# Patient Record
Sex: FEMALE | Race: WHITE | Employment: UNEMPLOYED | URBAN - NONMETROPOLITAN AREA
[De-identification: names, ages, dates, MRNs, and addresses within clinical notes are randomized per-mention and may not be internally consistent; named-entity substitution may affect disease eponyms.]

---

## 2017-04-17 ENCOUNTER — HOSPITAL ENCOUNTER (OUTPATIENT)
Dept: OTHER | Age: 42
Discharge: HOME | End: 2017-04-17
Attending: NURSE PRACTITIONER | Admitting: NURSE PRACTITIONER

## 2017-04-17 ENCOUNTER — OFFICE VISIT (OUTPATIENT)
Dept: OTHER | Age: 42
End: 2017-04-17

## 2017-04-17 VITALS
WEIGHT: 229 LBS | SYSTOLIC BLOOD PRESSURE: 118 MMHG | OXYGEN SATURATION: 99 % | HEART RATE: 100 BPM | DIASTOLIC BLOOD PRESSURE: 78 MMHG

## 2017-04-17 DIAGNOSIS — Z00.00 ENCOUNTER FOR PREVENTIVE HEALTH EXAMINATION: Primary | ICD-10-CM

## 2017-04-17 DIAGNOSIS — N92.0 MENORRHAGIA WITH REGULAR CYCLE: ICD-10-CM

## 2017-04-17 PROCEDURE — 99205 OFFICE O/P NEW HI 60 MIN: CPT | Performed by: NURSE PRACTITIONER

## 2017-04-17 ASSESSMENT — ENCOUNTER SYMPTOMS
EYES NEGATIVE: 1
RESPIRATORY NEGATIVE: 1
GASTROINTESTINAL NEGATIVE: 1
ALLERGIC/IMMUNOLOGIC NEGATIVE: 1

## 2017-04-24 ENCOUNTER — TELEPHONE (OUTPATIENT)
Dept: OTHER | Age: 42
End: 2017-04-24

## 2017-08-01 ENCOUNTER — HOSPITAL ENCOUNTER (OUTPATIENT)
Dept: OTHER | Age: 42
Discharge: HOME | End: 2017-08-01
Attending: NURSE PRACTITIONER | Admitting: NURSE PRACTITIONER

## 2017-08-01 ENCOUNTER — OFFICE VISIT (OUTPATIENT)
Dept: OTHER | Age: 42
End: 2017-08-01

## 2017-08-01 VITALS
OXYGEN SATURATION: 99 % | SYSTOLIC BLOOD PRESSURE: 126 MMHG | WEIGHT: 242 LBS | HEART RATE: 67 BPM | DIASTOLIC BLOOD PRESSURE: 74 MMHG

## 2017-08-01 DIAGNOSIS — Z13.0 SCREENING, ANEMIA, DEFICIENCY, IRON: ICD-10-CM

## 2017-08-01 DIAGNOSIS — Z00.00 HEALTH CARE MAINTENANCE: ICD-10-CM

## 2017-08-01 DIAGNOSIS — F33.2 SEVERE EPISODE OF RECURRENT MAJOR DEPRESSIVE DISORDER, WITHOUT PSYCHOTIC FEATURES (HCC): ICD-10-CM

## 2017-08-01 DIAGNOSIS — Z13.29 SCREENING FOR THYROID DISORDER: ICD-10-CM

## 2017-08-01 DIAGNOSIS — F41.9 ANXIETY: ICD-10-CM

## 2017-08-01 DIAGNOSIS — R45.86 MOOD SWINGS: Primary | ICD-10-CM

## 2017-08-01 PROCEDURE — 96160 PT-FOCUSED HLTH RISK ASSMT: CPT | Performed by: NURSE PRACTITIONER

## 2017-08-01 PROCEDURE — 99214 OFFICE O/P EST MOD 30 MIN: CPT | Performed by: NURSE PRACTITIONER

## 2017-08-01 RX ORDER — MEDROXYPROGESTERONE ACETATE 10 MG/1
TABLET ORAL
COMMUNITY
Start: 2017-07-27 | End: 2018-04-19

## 2017-08-01 ASSESSMENT — ENCOUNTER SYMPTOMS
RESPIRATORY NEGATIVE: 1
ALLERGIC/IMMUNOLOGIC NEGATIVE: 1
WHEEZING: 0
VOMITING: 0
SORE THROAT: 0
NAUSEA: 1
DIARRHEA: 0
BACK PAIN: 1
SHORTNESS OF BREATH: 0
COUGH: 0
PHOTOPHOBIA: 1

## 2017-08-01 ASSESSMENT — PATIENT HEALTH QUESTIONNAIRE - PHQ9
5. POOR APPETITE OR OVEREATING: 3
4. FEELING TIRED OR HAVING LITTLE ENERGY: 3
3. TROUBLE FALLING OR STAYING ASLEEP: 3
7. TROUBLE CONCENTRATING ON THINGS, SUCH AS READING THE NEWSPAPER OR WATCHING TELEVISION: 1
SUM OF ALL RESPONSES TO PHQ QUESTIONS 1-9: 18
10. IF YOU CHECKED OFF ANY PROBLEMS, HOW DIFFICULT HAVE THESE PROBLEMS MADE IT FOR YOU TO DO YOUR WORK, TAKE CARE OF THINGS AT HOME, OR GET ALONG WITH OTHER PEOPLE: 2
8. MOVING OR SPEAKING SO SLOWLY THAT OTHER PEOPLE COULD HAVE NOTICED. OR THE OPPOSITE, BEING SO FIGETY OR RESTLESS THAT YOU HAVE BEEN MOVING AROUND A LOT MORE THAN USUAL: 0
1. LITTLE INTEREST OR PLEASURE IN DOING THINGS: 3
SUM OF ALL RESPONSES TO PHQ9 QUESTIONS 1 & 2: 4
2. FEELING DOWN, DEPRESSED OR HOPELESS: 1
9. THOUGHTS THAT YOU WOULD BE BETTER OFF DEAD, OR OF HURTING YOURSELF: 1
6. FEELING BAD ABOUT YOURSELF - OR THAT YOU ARE A FAILURE OR HAVE LET YOURSELF OR YOUR FAMILY DOWN: 3

## 2017-08-01 ASSESSMENT — ANXIETY QUESTIONNAIRES
6. BECOMING EASILY ANNOYED OR IRRITABLE: 3-NEARLY EVERY DAY
5. BEING SO RESTLESS THAT IT IS HARD TO SIT STILL: 3-NEARLY EVERY DAY
1. FEELING NERVOUS, ANXIOUS, OR ON EDGE: 1-SEVERAL DAYS
GAD7 TOTAL SCORE: 14
7. FEELING AFRAID AS IF SOMETHING AWFUL MIGHT HAPPEN: 0-NOT AT ALL SURE
2. NOT BEING ABLE TO STOP OR CONTROL WORRYING: 3-NEARLY EVERY DAY
4. TROUBLE RELAXING: 1-SEVERAL DAYS
3. WORRYING TOO MUCH ABOUT DIFFERENT THINGS: 3-NEARLY EVERY DAY

## 2017-08-07 ENCOUNTER — OFFICE VISIT (OUTPATIENT)
Dept: OTHER | Age: 42
End: 2017-08-07

## 2017-08-07 ENCOUNTER — HOSPITAL ENCOUNTER (OUTPATIENT)
Dept: OTHER | Age: 42
Discharge: HOME | End: 2017-08-07
Attending: NURSE PRACTITIONER | Admitting: NURSE PRACTITIONER

## 2017-08-07 VITALS
SYSTOLIC BLOOD PRESSURE: 119 MMHG | OXYGEN SATURATION: 100 % | HEART RATE: 60 BPM | DIASTOLIC BLOOD PRESSURE: 65 MMHG | WEIGHT: 223 LBS

## 2017-08-07 DIAGNOSIS — Z11.4 SCREENING FOR HIV WITHOUT PRESENCE OF RISK FACTORS: ICD-10-CM

## 2017-08-07 DIAGNOSIS — Z13.220 SCREENING CHOLESTEROL LEVEL: ICD-10-CM

## 2017-08-07 DIAGNOSIS — F33.2 SEVERE EPISODE OF RECURRENT MAJOR DEPRESSIVE DISORDER, WITHOUT PSYCHOTIC FEATURES (HCC): Primary | ICD-10-CM

## 2017-08-07 PROCEDURE — 99212 OFFICE O/P EST SF 10 MIN: CPT | Performed by: NURSE PRACTITIONER

## 2017-08-07 ASSESSMENT — ENCOUNTER SYMPTOMS
ALLERGIC/IMMUNOLOGIC NEGATIVE: 1
RESPIRATORY NEGATIVE: 1
GASTROINTESTINAL NEGATIVE: 1
EYES NEGATIVE: 1

## 2017-08-14 ENCOUNTER — TELEPHONE (OUTPATIENT)
Dept: OTHER | Age: 42
End: 2017-08-14

## 2017-08-25 ENCOUNTER — HOSPITAL ENCOUNTER (OUTPATIENT)
Dept: OTHER | Age: 42
Discharge: HOME | End: 2017-08-25
Attending: NURSE PRACTITIONER | Admitting: NURSE PRACTITIONER

## 2017-08-25 ENCOUNTER — OFFICE VISIT (OUTPATIENT)
Dept: OTHER | Age: 42
End: 2017-08-25

## 2017-08-25 VITALS
OXYGEN SATURATION: 99 % | HEART RATE: 74 BPM | SYSTOLIC BLOOD PRESSURE: 128 MMHG | WEIGHT: 220 LBS | DIASTOLIC BLOOD PRESSURE: 86 MMHG

## 2017-08-25 DIAGNOSIS — F33.2 SEVERE EPISODE OF RECURRENT MAJOR DEPRESSIVE DISORDER, WITHOUT PSYCHOTIC FEATURES (HCC): ICD-10-CM

## 2017-08-25 DIAGNOSIS — R45.86 MOOD SWINGS: Primary | ICD-10-CM

## 2017-08-25 DIAGNOSIS — E78.5 DYSLIPIDEMIA: ICD-10-CM

## 2017-08-25 PROCEDURE — 99214 OFFICE O/P EST MOD 30 MIN: CPT | Performed by: NURSE PRACTITIONER

## 2017-08-25 RX ORDER — LAMOTRIGINE 25 MG/1
TABLET ORAL
Qty: 42 TABLET | Refills: 0 | Status: SHIPPED | OUTPATIENT
Start: 2017-08-25 | End: 2018-01-30 | Stop reason: SDUPTHER

## 2017-08-25 RX ORDER — ATORVASTATIN CALCIUM 40 MG/1
40 TABLET, FILM COATED ORAL DAILY
Qty: 30 TABLET | Refills: 0 | Status: SHIPPED | OUTPATIENT
Start: 2017-08-25 | End: 2018-04-19 | Stop reason: SDUPTHER

## 2017-08-25 ASSESSMENT — ENCOUNTER SYMPTOMS
SHORTNESS OF BREATH: 0
RESPIRATORY NEGATIVE: 1
NAUSEA: 0
COUGH: 0
VOMITING: 0
WHEEZING: 0
DIARRHEA: 0
GASTROINTESTINAL NEGATIVE: 1

## 2018-01-30 ENCOUNTER — OFFICE VISIT (OUTPATIENT)
Dept: INTERNAL MEDICINE CLINIC | Age: 43
End: 2018-01-30

## 2018-01-30 VITALS
HEIGHT: 67 IN | DIASTOLIC BLOOD PRESSURE: 78 MMHG | SYSTOLIC BLOOD PRESSURE: 120 MMHG | BODY MASS INDEX: 35 KG/M2 | OXYGEN SATURATION: 98 % | HEART RATE: 61 BPM | WEIGHT: 223 LBS | RESPIRATION RATE: 20 BRPM

## 2018-01-30 DIAGNOSIS — G56.02 CARPAL TUNNEL SYNDROME OF LEFT WRIST: Primary | ICD-10-CM

## 2018-01-30 DIAGNOSIS — R45.86 MOOD SWINGS: ICD-10-CM

## 2018-01-30 PROCEDURE — G8484 FLU IMMUNIZE NO ADMIN: HCPCS | Performed by: NURSE PRACTITIONER

## 2018-01-30 PROCEDURE — 1036F TOBACCO NON-USER: CPT | Performed by: NURSE PRACTITIONER

## 2018-01-30 PROCEDURE — 99212 OFFICE O/P EST SF 10 MIN: CPT | Performed by: NURSE PRACTITIONER

## 2018-01-30 PROCEDURE — G8427 DOCREV CUR MEDS BY ELIG CLIN: HCPCS | Performed by: NURSE PRACTITIONER

## 2018-01-30 PROCEDURE — G8417 CALC BMI ABV UP PARAM F/U: HCPCS | Performed by: NURSE PRACTITIONER

## 2018-01-30 RX ORDER — LAMOTRIGINE 25 MG/1
TABLET ORAL
Qty: 42 TABLET | Refills: 0 | Status: SHIPPED | OUTPATIENT
Start: 2018-01-30 | End: 2018-02-19 | Stop reason: SDUPTHER

## 2018-01-30 RX ORDER — GABAPENTIN 300 MG/1
300 CAPSULE ORAL 3 TIMES DAILY
Qty: 90 CAPSULE | Refills: 0 | Status: SHIPPED | OUTPATIENT
Start: 2018-01-30 | End: 2018-04-19 | Stop reason: SDUPTHER

## 2018-02-19 DIAGNOSIS — R45.86 MOOD SWINGS: ICD-10-CM

## 2018-02-19 RX ORDER — LAMOTRIGINE 25 MG/1
TABLET ORAL
Qty: 42 TABLET | Refills: 0 | Status: SHIPPED | OUTPATIENT
Start: 2018-02-19 | End: 2018-04-19 | Stop reason: DRUGHIGH

## 2018-04-19 ENCOUNTER — OFFICE VISIT (OUTPATIENT)
Dept: INTERNAL MEDICINE CLINIC | Age: 43
End: 2018-04-19

## 2018-04-19 VITALS
OXYGEN SATURATION: 95 % | BODY MASS INDEX: 35.4 KG/M2 | WEIGHT: 226 LBS | SYSTOLIC BLOOD PRESSURE: 104 MMHG | HEART RATE: 69 BPM | DIASTOLIC BLOOD PRESSURE: 70 MMHG

## 2018-04-19 DIAGNOSIS — R45.86 MOOD SWINGS: Primary | ICD-10-CM

## 2018-04-19 DIAGNOSIS — Z00.00 HEALTH CARE MAINTENANCE: ICD-10-CM

## 2018-04-19 DIAGNOSIS — E78.5 DYSLIPIDEMIA: ICD-10-CM

## 2018-04-19 DIAGNOSIS — G89.29 CHRONIC BILATERAL LOW BACK PAIN WITHOUT SCIATICA: ICD-10-CM

## 2018-04-19 DIAGNOSIS — F33.1 MODERATE EPISODE OF RECURRENT MAJOR DEPRESSIVE DISORDER (HCC): ICD-10-CM

## 2018-04-19 DIAGNOSIS — F33.2 SEVERE EPISODE OF RECURRENT MAJOR DEPRESSIVE DISORDER, WITHOUT PSYCHOTIC FEATURES (HCC): ICD-10-CM

## 2018-04-19 DIAGNOSIS — G56.02 CARPAL TUNNEL SYNDROME OF LEFT WRIST: ICD-10-CM

## 2018-04-19 DIAGNOSIS — M54.50 CHRONIC BILATERAL LOW BACK PAIN WITHOUT SCIATICA: ICD-10-CM

## 2018-04-19 PROCEDURE — G8427 DOCREV CUR MEDS BY ELIG CLIN: HCPCS | Performed by: NURSE PRACTITIONER

## 2018-04-19 PROCEDURE — 1036F TOBACCO NON-USER: CPT | Performed by: NURSE PRACTITIONER

## 2018-04-19 PROCEDURE — 99214 OFFICE O/P EST MOD 30 MIN: CPT | Performed by: NURSE PRACTITIONER

## 2018-04-19 PROCEDURE — G8417 CALC BMI ABV UP PARAM F/U: HCPCS | Performed by: NURSE PRACTITIONER

## 2018-04-19 RX ORDER — ATORVASTATIN CALCIUM 40 MG/1
40 TABLET, FILM COATED ORAL DAILY
Qty: 90 TABLET | Refills: 1 | Status: SHIPPED | OUTPATIENT
Start: 2018-04-19 | End: 2019-06-21 | Stop reason: ALTCHOICE

## 2018-04-19 RX ORDER — LAMOTRIGINE 25 MG/1
TABLET ORAL
Qty: 42 TABLET | Refills: 0 | Status: CANCELLED | OUTPATIENT
Start: 2018-04-19

## 2018-04-19 RX ORDER — LAMOTRIGINE 25 MG/1
50 TABLET ORAL DAILY
Qty: 180 TABLET | Refills: 1 | Status: SHIPPED | OUTPATIENT
Start: 2018-04-19 | End: 2019-06-21

## 2018-04-19 RX ORDER — GABAPENTIN 300 MG/1
300 CAPSULE ORAL 3 TIMES DAILY
Qty: 270 CAPSULE | Refills: 0 | Status: SHIPPED | OUTPATIENT
Start: 2018-04-19 | End: 2019-06-21 | Stop reason: ALTCHOICE

## 2018-04-21 ENCOUNTER — HOSPITAL ENCOUNTER (OUTPATIENT)
Dept: LAB | Age: 43
Discharge: HOME | End: 2018-04-21
Attending: NURSE PRACTITIONER | Admitting: NURSE PRACTITIONER

## 2018-04-21 LAB
ALBUMIN SERPL-MCNC: 3.7 GM/DL (ref 3.4–5)
ALP BLD-CCNC: 83 IU/L (ref 40–129)
ALT SERPL-CCNC: 15 U/L (ref 10–40)
ANION GAP SERPL CALCULATED.3IONS-SCNC: 8 MMOL/L (ref 4–16)
AST SERPL-CCNC: 22 IU/L (ref 15–37)
BASOPHILS ABSOLUTE: 0 K/CU MM
BASOPHILS RELATIVE PERCENT: 0.6 % (ref 0–1)
BILIRUB SERPL-MCNC: 0.5 MG/DL (ref 0–1)
BUN BLDV-MCNC: 14 MG/DL (ref 6–23)
CALCIUM SERPL-MCNC: 8.3 MG/DL (ref 8.3–10.6)
CHLORIDE BLD-SCNC: 104 MMOL/L (ref 99–110)
CHOLESTEROL, FASTING: 172 MG/DL
CO2: 28 MMOL/L (ref 21–32)
CREAT SERPL-MCNC: 1 MG/DL (ref 0.6–1.1)
DIFFERENTIAL TYPE: ABNORMAL
EOSINOPHILS ABSOLUTE: 0.4 K/CU MM
EOSINOPHILS RELATIVE PERCENT: 5.4 % (ref 0–3)
ESTIMATED AVERAGE GLUCOSE: 103 MG/DL
GFR AFRICAN AMERICAN: >60 ML/MIN/1.73M2
GFR NON-AFRICAN AMERICAN: >60 ML/MIN/1.73M2
GLUCOSE FASTING: 86 MG/DL (ref 70–99)
HBA1C MFR BLD: 5.2 % (ref 4.2–6.3)
HCT VFR BLD CALC: 37.3 % (ref 37–47)
HDLC SERPL-MCNC: 68 MG/DL
HEMOGLOBIN: 11.9 GM/DL (ref 12.5–16)
IMMATURE NEUTROPHIL %: 0.3 % (ref 0–0.43)
LDL CHOLESTEROL DIRECT: 113 MG/DL
LYMPHOCYTES ABSOLUTE: 1.3 K/CU MM
LYMPHOCYTES RELATIVE PERCENT: 18.7 % (ref 24–44)
MCH RBC QN AUTO: 27.4 PG (ref 27–31)
MCHC RBC AUTO-ENTMCNC: 31.9 % (ref 32–36)
MCV RBC AUTO: 85.7 FL (ref 78–100)
MONOCYTES ABSOLUTE: 0.5 K/CU MM
MONOCYTES RELATIVE PERCENT: 7.6 % (ref 0–4)
PDW BLD-RTO: 13.3 % (ref 11.7–14.9)
PLATELET # BLD: 247 K/CU MM (ref 140–440)
PMV BLD AUTO: 9.9 FL (ref 7.5–11.1)
POTASSIUM SERPL-SCNC: 4.2 MMOL/L (ref 3.5–5.1)
RBC # BLD: 4.35 M/CU MM (ref 4.2–5.4)
SEGMENTED NEUTROPHILS ABSOLUTE COUNT: 4.5 K/CU MM
SEGMENTED NEUTROPHILS RELATIVE PERCENT: 67.4 % (ref 36–66)
SODIUM BLD-SCNC: 140 MMOL/L (ref 135–145)
T4 FREE: 1.12 NG/DL (ref 0.9–1.8)
TOTAL IMMATURE NEUTOROPHIL: 0.02 K/CU MM
TOTAL PROTEIN: 7.4 GM/DL (ref 6.4–8.2)
TRIGLYCERIDE, FASTING: 49 MG/DL
TSH HIGH SENSITIVITY: 1.48 UIU/ML (ref 0.27–4.2)
WBC # BLD: 6.7 K/CU MM (ref 4–10.5)

## 2018-04-22 PROBLEM — M54.50 CHRONIC BILATERAL LOW BACK PAIN WITHOUT SCIATICA: Status: ACTIVE | Noted: 2018-04-22

## 2018-04-22 PROBLEM — F33.2 SEVERE EPISODE OF RECURRENT MAJOR DEPRESSIVE DISORDER, WITHOUT PSYCHOTIC FEATURES (HCC): Status: RESOLVED | Noted: 2017-08-01 | Resolved: 2018-04-22

## 2018-04-22 PROBLEM — E78.5 DYSLIPIDEMIA: Status: ACTIVE | Noted: 2018-04-22

## 2018-04-22 PROBLEM — G89.29 CHRONIC BILATERAL LOW BACK PAIN WITHOUT SCIATICA: Status: ACTIVE | Noted: 2018-04-22

## 2019-06-21 ENCOUNTER — OFFICE VISIT (OUTPATIENT)
Dept: INTERNAL MEDICINE CLINIC | Age: 44
End: 2019-06-21
Payer: COMMERCIAL

## 2019-06-21 VITALS
RESPIRATION RATE: 16 BRPM | OXYGEN SATURATION: 98 % | TEMPERATURE: 97.7 F | WEIGHT: 214 LBS | DIASTOLIC BLOOD PRESSURE: 66 MMHG | HEIGHT: 66 IN | HEART RATE: 64 BPM | BODY MASS INDEX: 34.39 KG/M2 | SYSTOLIC BLOOD PRESSURE: 126 MMHG

## 2019-06-21 DIAGNOSIS — M25.522 BILATERAL ELBOW JOINT PAIN: ICD-10-CM

## 2019-06-21 DIAGNOSIS — M25.521 BILATERAL ELBOW JOINT PAIN: ICD-10-CM

## 2019-06-21 DIAGNOSIS — F33.1 MODERATE EPISODE OF RECURRENT MAJOR DEPRESSIVE DISORDER (HCC): Primary | ICD-10-CM

## 2019-06-21 DIAGNOSIS — E78.5 DYSLIPIDEMIA: ICD-10-CM

## 2019-06-21 PROCEDURE — 99203 OFFICE O/P NEW LOW 30 MIN: CPT | Performed by: INTERNAL MEDICINE

## 2019-06-21 RX ORDER — ESCITALOPRAM OXALATE 10 MG/1
10 TABLET ORAL DAILY
Qty: 30 TABLET | Refills: 3 | Status: SHIPPED | OUTPATIENT
Start: 2019-06-21 | End: 2021-01-18

## 2019-06-21 NOTE — PROGRESS NOTES
Manan Hernández  Patient's  is 1975  Seen in office on 2019      SUBJECTIVE:  Siobhan pichardo 38 y. o.year old female presents today   Chief Complaint   Patient presents with   Rubi Maldonado Doctor     previous pt of Marlyn Galvan    Depression     loss of father and neice,\"cant take it anymoe\"    Pain     elbow bilateral, constant pain     Anxiety     happen at work     Patient is here to establish as a new patient. Complains of depression. It got worse after the loss of her father and niece. Patient feels down. No suicidal ideation. Complains of pain in both shoulders. Denies any injury. Patient has history of anxiety usually happens at work. Patient denies any chest pain. No shortness of breath. No cough or sputum production  No nausea, vomiting or diarrhea. Taking medications regularly. No side effects noted. Review of Systems  Review of system as mentioned in HPI  OBJECTIVE: /66   Pulse 64   Temp 97.7 °F (36.5 °C)   Resp 16   Ht 5' 5.5\" (1.664 m)   Wt 214 lb (97.1 kg)   SpO2 98%   BMI 35.07 kg/m²     Wt Readings from Last 3 Encounters:   19 214 lb (97.1 kg)   18 226 lb (102.5 kg)   18 223 lb (101.2 kg)      GENERAL: - Alert, oriented, pleasant, in no apparent distress. HEENT: - Conjunctiva pink, no scleral icterus. ENT clear. NECK: -Supple. No jugular venous distention noted. No masses felt,  CARDIOVASCULAR: - Normal S1 and S2    PULMONARY: - No respiratory distress. No wheezes or rales. ABDOMEN: - Soft and non-tender,no masses  ororganomegaly. EXTREMITIES: - No cyanosis, clubbing, or significant edema. SKIN: Skin is warm and dry. NEUROLOGICAL: - Cranial nerves II through XII are grossly intact. IMPRESSION:    Encounter Diagnoses   Name Primary?  Moderate episode of recurrent major depressive disorder (HCC) Yes    Dyslipidemia     Bilateral elbow joint pain        ASSESSMENT/PLAN:    1. Patient has history of depression and anxiety. Will start patient on Lexapro. 2.  Referred patient to psychology  Tarah Son  3. Patient has history of hyperlipidemia. We will recheck lipid profile  4. Patient has bilateral elbow joint pain. Will do work-up for arthritis  5. Side effects of medication discussed. If increased depression call or go to ER  6. Return to office in 1 month    Orders Placed This Encounter   Medications    escitalopram (LEXAPRO) 10 MG tablet     Sig: Take 1 tablet by mouth daily     Dispense:  30 tablet     Refill:  3         No problem-specific Assessment & Plan notes found for this encounter. Orders Placed This Encounter   Procedures    Comprehensive Metabolic Panel    Lipid, Fasting    CBC Auto Differential    STEPHY    Rheumatoid Factor    Uric Acid    Sedimentation Rate    TSH without Reflex    Ambulatory referral to Psychology     Orders Placed This Encounter   Medications    escitalopram (LEXAPRO) 10 MG tablet     Sig: Take 1 tablet by mouth daily     Dispense:  30 tablet     Refill:  3         Mediations reviewed with the patient. Continue current medications. Appropriate prescriptions are addressed. After visit summeryprovided. Follow up as directed sooner if needed. Questions answered and patient verbalizes understanding. Call for any problems, questions, or concerns. No Known Allergies  Current Outpatient Medications   Medication Sig Dispense Refill    gabapentin (NEURONTIN) 300 MG capsule Take 1 capsule by mouth 3 times daily for 90 days. . 270 capsule 0    atorvastatin (LIPITOR) 40 MG tablet Take 1 tablet by mouth daily 90 tablet 1     No current facility-administered medications for this visit.       Past Medical History:   Diagnosis Date    Acute renal failure ([facility] [address] 4/23/2015    Carpal tunnel syndrome of left wrist 1/30/2018    Chronic back pain     Depression 12/5/2015    Headache     rare migraine    Kidney stone     Obesity     Ovarian cyst      Past Surgical History:   Procedure Laterality Date    KIDNEY STONE SURGERY Left 2015    TUBAL LIGATION       Social History     Tobacco Use    Smoking status: Never Smoker    Smokeless tobacco: Never Used   Substance Use Topics    Alcohol use: No       LAB REVIEW:  CBC:   Lab Results   Component Value Date    WBC 6.7 04/21/2018    HGB 11.9 04/21/2018    HCT 37.3 04/21/2018     04/21/2018     Lipids:   Lab Results   Component Value Date    HDL 68 04/21/2018    LDLDIRECT 113 (H) 04/21/2018    TRIGLYCFAST 49 04/21/2018     Renal:   Lab Results   Component Value Date    BUN 14 04/21/2018    CREATININE 1.0 04/21/2018     04/21/2018    K 4.2 04/21/2018    ALT 15 04/21/2018    AST 22 04/21/2018     PT/INR: No results found for: INR  A1C:   Lab Results   Component Value Date    LABA1C 5.2 04/21/2018           Francisco Martell MD, 6/21/2019 , 10:18 AM

## 2019-07-02 LAB
ALBUMIN SERPL-MCNC: 3.9 G/DL
ALP BLD-CCNC: 85 U/L
ALT SERPL-CCNC: 14 U/L
ANA TITER: NORMAL
ANION GAP SERPL CALCULATED.3IONS-SCNC: NORMAL MMOL/L
AST SERPL-CCNC: 20 U/L
BASOPHILS ABSOLUTE: NORMAL /ΜL
BASOPHILS RELATIVE PERCENT: 0.5 %
BILIRUB SERPL-MCNC: 0.3 MG/DL (ref 0.1–1.4)
BUN BLDV-MCNC: 8 MG/DL
CALCIUM SERPL-MCNC: 9 MG/DL
CHLORIDE BLD-SCNC: 100 MMOL/L
CHOLESTEROL, FASTING: 167
CO2: 27 MMOL/L
CREAT SERPL-MCNC: 0.89 MG/DL
EOSINOPHILS ABSOLUTE: NORMAL /ΜL
EOSINOPHILS RELATIVE PERCENT: 2.1 %
GFR CALCULATED: 73
GLUCOSE BLD-MCNC: 92 MG/DL
HCT VFR BLD CALC: 39.4 % (ref 36–46)
HDLC SERPL-MCNC: 41 MG/DL (ref 35–70)
HEMOGLOBIN: 13.4 G/DL (ref 12–16)
LDL CHOLESTEROL CALCULATED: 107 MG/DL (ref 0–160)
LYMPHOCYTES ABSOLUTE: NORMAL /ΜL
LYMPHOCYTES RELATIVE PERCENT: 23.2 %
MCH RBC QN AUTO: 28.4 PG
MCHC RBC AUTO-ENTMCNC: 34.1 G/DL
MCV RBC AUTO: 83.3 FL
MONOCYTES ABSOLUTE: NORMAL /ΜL
MONOCYTES RELATIVE PERCENT: 7.1 %
NEUTROPHILS ABSOLUTE: NORMAL /ΜL
NEUTROPHILS RELATIVE PERCENT: 67.1 %
PDW BLD-RTO: 12.9 %
PLATELET # BLD: 340 K/ΜL
PMV BLD AUTO: NORMAL FL
POTASSIUM SERPL-SCNC: 3.8 MMOL/L
RBC # BLD: 4.73 10^6/ΜL
RHEUMATOID FACTOR: <10
SODIUM BLD-SCNC: 139 MMOL/L
TOTAL PROTEIN: 7.7
TRIGLYCERIDE, FASTING: 93
TSH SERPL DL<=0.05 MIU/L-ACNC: 0.99 UIU/ML
URIC ACID: 4.6
WBC # BLD: 6.4 10^3/ML

## 2019-07-12 ENCOUNTER — OFFICE VISIT (OUTPATIENT)
Dept: INTERNAL MEDICINE CLINIC | Age: 44
End: 2019-07-12
Payer: COMMERCIAL

## 2019-07-12 ENCOUNTER — HOSPITAL ENCOUNTER (OUTPATIENT)
Age: 44
Discharge: HOME | End: 2019-07-12
Payer: COMMERCIAL

## 2019-07-12 VITALS
BODY MASS INDEX: 34.09 KG/M2 | OXYGEN SATURATION: 99 % | DIASTOLIC BLOOD PRESSURE: 86 MMHG | WEIGHT: 208 LBS | SYSTOLIC BLOOD PRESSURE: 102 MMHG | HEART RATE: 79 BPM

## 2019-07-12 DIAGNOSIS — F33.1 MODERATE EPISODE OF RECURRENT MAJOR DEPRESSIVE DISORDER (HCC): ICD-10-CM

## 2019-07-12 DIAGNOSIS — A04.71 RECURRENT CLOSTRIDIUM DIFFICILE DIARRHEA: Primary | ICD-10-CM

## 2019-07-12 DIAGNOSIS — A04.71 RECURRENT CLOSTRIDIUM DIFFICILE DIARRHEA: ICD-10-CM

## 2019-07-12 PROCEDURE — 87324 CLOSTRIDIUM AG IA: CPT

## 2019-07-12 PROCEDURE — 99213 OFFICE O/P EST LOW 20 MIN: CPT | Performed by: INTERNAL MEDICINE

## 2019-07-12 RX ORDER — METRONIDAZOLE 500 MG/1
500 TABLET ORAL 3 TIMES DAILY
Qty: 30 TABLET | Refills: 0 | Status: SHIPPED | OUTPATIENT
Start: 2019-07-12 | End: 2019-07-22

## 2019-07-12 RX ORDER — ESCITALOPRAM OXALATE 10 MG/1
10 TABLET ORAL DAILY
Qty: 30 TABLET | Refills: 0 | Status: SHIPPED | OUTPATIENT
Start: 2019-07-12 | End: 2019-07-25 | Stop reason: SDUPTHER

## 2019-07-12 NOTE — PROGRESS NOTES
Alex Bang  Patient's  is 1975  Seen in office on 2019      SUBJECTIVE:  Hugo Groves kylee 38 y. o.year old female presents today   Chief Complaint   Patient presents with    Diarrhea     Patient states she was hospitalized for Cdiff in .  Depression     Patient states she is very depressed. She is tearful and states today is her fathers birthday whom is passed. Patient states she had developed diarrhea went to the hospital and she was positive for C. difficile. She was given vancomycin which was very expensive for her. However she took the medications and improved for few days and the symptoms recurred again. Patient denies any nausea or vomiting. She is having some abdominal cramps and diarrhea again. No melena or hematochezia. No fever or chills  She is feeling depressed today and that is her  father's birthday. She is taking medications. Taking medications regularly. No side effects noted. Review of Systems    OBJECTIVE: /86 (Site: Right Upper Arm, Position: Sitting, Cuff Size: Medium Adult)   Pulse 79   Wt 208 lb (94.3 kg)   SpO2 99%   BMI 34.09 kg/m²     Wt Readings from Last 3 Encounters:   19 208 lb (94.3 kg)   19 214 lb (97.1 kg)   18 226 lb (102.5 kg)      GENERAL: - Alert, oriented, pleasant, in no apparent distress. HEENT: - Conjunctiva pink, no scleral icterus. ENT clear. NECK: -Supple. No jugular venous distention noted. No masses felt,  CARDIOVASCULAR: - Normal S1 and S2    PULMONARY: - No respiratory distress. No wheezes or rales. ABDOMEN: - Soft and non-tender,no masses  ororganomegaly. EXTREMITIES: - No cyanosis, clubbing, or significant edema. SKIN: Skin is warm and dry. NEUROLOGICAL: - Cranial nerves II through XII are grossly intact. IMPRESSION:    Encounter Diagnoses   Name Primary?     Recurrent Clostridium difficile diarrhea Yes    Moderate episode of recurrent major depressive disorder ([facility] [address]  04/21/2018     Lipids:   Lab Results   Component Value Date    HDL 68 04/21/2018    LDLDIRECT 113 (H) 04/21/2018    TRIGLYCFAST 49 04/21/2018     Renal:   Lab Results   Component Value Date    BUN 14 04/21/2018    CREATININE 1.0 04/21/2018     04/21/2018    K 4.2 04/21/2018    ALT 15 04/21/2018    AST 22 04/21/2018     PT/INR: No results found for: INR  A1C:   Lab Results   Component Value Date    LABA1C 5.2 04/21/2018           Reinaldo Irving MD, 7/12/2019 , 9:26 AM

## 2019-07-13 LAB
REASON FOR REJECTION: NORMAL
REJECTED TEST: NORMAL

## 2019-07-25 ENCOUNTER — OFFICE VISIT (OUTPATIENT)
Dept: INTERNAL MEDICINE CLINIC | Age: 44
End: 2019-07-25
Payer: COMMERCIAL

## 2019-07-25 VITALS
RESPIRATION RATE: 16 BRPM | TEMPERATURE: 97.7 F | BODY MASS INDEX: 33.07 KG/M2 | DIASTOLIC BLOOD PRESSURE: 68 MMHG | WEIGHT: 201.8 LBS | HEART RATE: 64 BPM | SYSTOLIC BLOOD PRESSURE: 118 MMHG | OXYGEN SATURATION: 98 %

## 2019-07-25 DIAGNOSIS — M25.522 BILATERAL ELBOW JOINT PAIN: ICD-10-CM

## 2019-07-25 DIAGNOSIS — R10.31 RIGHT LOWER QUADRANT ABDOMINAL PAIN: ICD-10-CM

## 2019-07-25 DIAGNOSIS — F33.1 MODERATE EPISODE OF RECURRENT MAJOR DEPRESSIVE DISORDER (HCC): ICD-10-CM

## 2019-07-25 DIAGNOSIS — M25.521 BILATERAL ELBOW JOINT PAIN: ICD-10-CM

## 2019-07-25 DIAGNOSIS — Z12.31 VISIT FOR SCREENING MAMMOGRAM: Primary | ICD-10-CM

## 2019-07-25 LAB
BILIRUBIN, POC: ABNORMAL
BLOOD URINE, POC: ABNORMAL
CLARITY, POC: ABNORMAL
COLOR, POC: ABNORMAL
GLUCOSE URINE, POC: NEGATIVE
KETONES, POC: NEGATIVE
LEUKOCYTE EST, POC: NEGATIVE
NITRITE, POC: NEGATIVE
PH, POC: 5.5
PROTEIN, POC: ABNORMAL
SPECIFIC GRAVITY, POC: >=1.03
UROBILINOGEN, POC: 0.2

## 2019-07-25 PROCEDURE — 1036F TOBACCO NON-USER: CPT | Performed by: INTERNAL MEDICINE

## 2019-07-25 PROCEDURE — G8417 CALC BMI ABV UP PARAM F/U: HCPCS | Performed by: INTERNAL MEDICINE

## 2019-07-25 PROCEDURE — 81002 URINALYSIS NONAUTO W/O SCOPE: CPT | Performed by: INTERNAL MEDICINE

## 2019-07-25 PROCEDURE — 99213 OFFICE O/P EST LOW 20 MIN: CPT | Performed by: INTERNAL MEDICINE

## 2019-07-25 PROCEDURE — G8427 DOCREV CUR MEDS BY ELIG CLIN: HCPCS | Performed by: INTERNAL MEDICINE

## 2019-07-25 NOTE — PROGRESS NOTES
Leandro Shown  Patient's  is 1975  Seen in office on 2019      SUBJECTIVE:  Shauna Eagle kylee 38 y. o.year old female presents today   Chief Complaint   Patient presents with    Abdominal Pain     still    Diarrhea    Weight Loss    Other     just found out sister dx with breast cancer     Patient states she has nausea since yesterday. Pt had diarrhea and had c.diff and was treated. She had solid stools and c. Diff was not tested. She has no fever or chills. Took flagy and she finished medication  When she had C.diff infection she went to [facility]   CBC was normal.  BMP was normal.   Other stool culture is negative. CT abdomen : 3mm stone in left kidney . No hydronephrosis. 2.5 cm right ovarian cyst.    Taking medications regularly. No side effects noted. Review of Systems    OBJECTIVE: /68   Pulse 64   Temp 97.7 °F (36.5 °C) (Oral)   Resp 16   Wt 201 lb 12.8 oz (91.5 kg)   SpO2 98%   BMI 33.07 kg/m²     Wt Readings from Last 3 Encounters:   19 201 lb 12.8 oz (91.5 kg)   19 208 lb (94.3 kg)   19 214 lb (97.1 kg)      GENERAL: - Alert, oriented, pleasant, in no apparent distress. Return to office in 1 month  HEENT: - Conjunctiva pink, no scleral icterus. ENT clear. NECK: -Supple. No jugular venous distention noted. No masses felt,  CARDIOVASCULAR: - Normal S1 and S2    PULMONARY: - No respiratory distress. No wheezes or rales. ABDOMEN: - Soft and non-tender,no masses  ororganomegaly. EXTREMITIES: - No cyanosis, clubbing, or significant edema. SKIN: Skin is warm and dry. NEUROLOGICAL: - Cranial nerves II through XII are grossly intact. Patient had labs done on 2019 at [facility].  Comprehensive metabolic panel is normal  Cholesterol 167, , HDL 41 and triglycerides 93. CBC is normal  ESR was 44    IMPRESSION:    Encounter Diagnoses   Name Primary?     Visit for screening mammogram Yes    Moderate episode of recurrent major depressive disorder ([facility] [address]     Bilateral elbow joint pain     Right lower quadrant abdominal pain        ASSESSMENT/PLAN:    Abdominal pain off and on. : refer to GI  C.diff has seem to resolved. Recheck is stools are loose  Weight loss : pt states she is working hard and has lost weight. Depression : continue lexapro and see Dr Rashida Gibbons. RTO in 1 month        Mediations reviewed with the patient. Continue current medications. Appropriate prescriptions are addressed. After visit summeryprovided. Follow up as directed sooner if needed. Questions answered and patient verbalizes understanding. Call for any problems, questions, or concerns. No Known Allergies  Current Outpatient Medications   Medication Sig Dispense Refill    escitalopram (LEXAPRO) 10 MG tablet Take 1 tablet by mouth daily 30 tablet 3     No current facility-administered medications for this visit.       Past Medical History:   Diagnosis Date    Acute renal failure ([facility] [address] 4/23/2015    Carpal tunnel syndrome of left wrist 1/30/2018    Chronic back pain     Depression 12/5/2015    Headache     rare migraine    Kidney stone     Obesity     Ovarian cyst      Past Surgical History:   Procedure Laterality Date    KIDNEY STONE SURGERY Left 2015    TUBAL LIGATION       Social History     Tobacco Use    Smoking status: Never Smoker    Smokeless tobacco: Never Used   Substance Use Topics    Alcohol use: No       LAB REVIEW:  CBC:   Lab Results   Component Value Date    WBC 6.7 04/21/2018    HGB 11.9 04/21/2018    HCT 37.3 04/21/2018     04/21/2018     Lipids:   Lab Results   Component Value Date    HDL 68 04/21/2018    LDLDIRECT 113 (H) 04/21/2018    TRIGLYCFAST 49 04/21/2018     Renal:   Lab Results   Component Value Date    BUN 14 04/21/2018    CREATININE 1.0 04/21/2018     04/21/2018    K 4.2 04/21/2018    ALT 15 04/21/2018    AST 22 04/21/2018     PT/INR: No results found for: INR  A1C:   Lab Results   Component Value Date

## 2019-08-09 DIAGNOSIS — F33.1 MODERATE EPISODE OF RECURRENT MAJOR DEPRESSIVE DISORDER (HCC): ICD-10-CM

## 2019-08-09 DIAGNOSIS — M25.522 BILATERAL ELBOW JOINT PAIN: ICD-10-CM

## 2019-08-09 DIAGNOSIS — M25.521 BILATERAL ELBOW JOINT PAIN: ICD-10-CM

## 2019-08-09 DIAGNOSIS — E78.5 DYSLIPIDEMIA: ICD-10-CM

## 2020-04-20 ENCOUNTER — TELEPHONE (OUTPATIENT)
Dept: INTERNAL MEDICINE CLINIC | Age: 45
End: 2020-04-20

## 2020-04-20 NOTE — TELEPHONE ENCOUNTER
Left message on voicemail to call office. Has not been seen since July 2019 and needs a VV with Dr. Rayray Brownlee had a couple of visits to [facility] ED.

## 2020-04-28 LAB
BASOPHILS ABSOLUTE: 0 /ΜL
BASOPHILS RELATIVE PERCENT: 0.7 %
EOSINOPHILS ABSOLUTE: 100 /ΜL
EOSINOPHILS RELATIVE PERCENT: 2.5 %
HCT VFR BLD CALC: 36 % (ref 36–46)
HEMOGLOBIN: 12.2 G/DL (ref 12–16)
LYMPHOCYTES ABSOLUTE: 1200 /ΜL
LYMPHOCYTES RELATIVE PERCENT: 21.5 %
MCH RBC QN AUTO: 29.1 PG
MCHC RBC AUTO-ENTMCNC: 33.9 G/DL
MCV RBC AUTO: 86 FL
MONOCYTES ABSOLUTE: 500 /ΜL
MONOCYTES RELATIVE PERCENT: 8.4 %
NEUTROPHILS ABSOLUTE: 3700 /ΜL
NEUTROPHILS RELATIVE PERCENT: 66.9 %
PLATELET # BLD: 330 K/ΜL
PMV BLD AUTO: NORMAL FL
RBC # BLD: 4.2 10^6/ΜL
WBC # BLD: 5.5 10^3/ML

## 2020-05-06 ENCOUNTER — TELEPHONE (OUTPATIENT)
Dept: INTERNAL MEDICINE CLINIC | Age: 45
End: 2020-05-06

## 2020-05-06 NOTE — TELEPHONE ENCOUNTER
[facility] Trans[facility] Initial Follow Up Call    Outreach made within 2 business days of discharge: Yes    Patient: Yue Umaña Patient : 1975   MRN: [id number]  Reason for Admission: No discharge information exists for this patient. Discharge Date:         Spoke with:patient   Patient stated that she is doing better, does not feel she needs a fu at this time. Will contact office if need be. Discharge department/facility: [facility] Interactive Patient Contact:  Was patient able to fill all prescriptions: Yes  Was patient instructed to bring all medications to the follow-up visit: Yes  Is patient taking all medications as directed in the discharge summary? Yes  Does patient understand their discharge instructions: Yes  Does patient have questions or concerns that need addressed prior to 7-14 day follow up office visit: no    Scheduled appointment with PCP within 7-14 days    Follow Up  No future appointments.     Cindy Monet MA

## 2021-01-18 ENCOUNTER — VIRTUAL VISIT (OUTPATIENT)
Dept: INTERNAL MEDICINE CLINIC | Age: 46
End: 2021-01-18
Payer: COMMERCIAL

## 2021-01-18 DIAGNOSIS — F33.1 MODERATE EPISODE OF RECURRENT MAJOR DEPRESSIVE DISORDER (HCC): Primary | ICD-10-CM

## 2021-01-18 DIAGNOSIS — F41.9 ANXIETY: ICD-10-CM

## 2021-01-18 PROCEDURE — G8421 BMI NOT CALCULATED: HCPCS | Performed by: INTERNAL MEDICINE

## 2021-01-18 PROCEDURE — 1036F TOBACCO NON-USER: CPT | Performed by: INTERNAL MEDICINE

## 2021-01-18 PROCEDURE — 99213 OFFICE O/P EST LOW 20 MIN: CPT | Performed by: INTERNAL MEDICINE

## 2021-01-18 PROCEDURE — G8427 DOCREV CUR MEDS BY ELIG CLIN: HCPCS | Performed by: INTERNAL MEDICINE

## 2021-01-18 PROCEDURE — G8484 FLU IMMUNIZE NO ADMIN: HCPCS | Performed by: INTERNAL MEDICINE

## 2021-01-18 RX ORDER — ESCITALOPRAM OXALATE 10 MG/1
10 TABLET ORAL DAILY
Qty: 30 TABLET | Refills: 3 | Status: SHIPPED | OUTPATIENT
Start: 2021-01-18 | End: 2021-11-29 | Stop reason: SDUPTHER

## 2021-01-18 RX ORDER — HYDROXYZINE PAMOATE 25 MG/1
25 CAPSULE ORAL DAILY PRN
Qty: 30 CAPSULE | Refills: 0 | Status: SHIPPED | OUTPATIENT
Start: 2021-01-18 | End: 2021-11-29 | Stop reason: SDUPTHER

## 2021-01-18 NOTE — PROGRESS NOTES
2021    TELEHEALTH EVALUATION -- Audio/Visual (During NSOAN-21 public health emergency)    HPI:    Garcia Engel (:  1975) has requested an audio/video evaluation for the following concern(s):    Chief Complaint   Patient presents with    Anxiety    Depression    Headache     mainly at [facility]     Patient has a history of anxiety and depression about 2 years ago and was taking Lexapro at the time but she started feeling better and stopped taking the medications and did not keep follow-up appointments. Patient states she started developing again anxiety and depression few weeks ago. No suicidal ideations. Complaining of some headaches . HA at night. No blurred vision. No fever or chills. Pt states kids are grown up . Works in car [employer]  lexapro worked in the past.     Review of Systems    Prior to Visit Medications    Medication Sig Taking?  Authorizing Provider   escitalopram (LEXAPRO) 10 MG tablet Take 1 tablet by mouth daily  Patient not taking: Reported on 2021  Wendy Snider MD       Social History     Tobacco Use    Smoking status: Never Smoker    Smokeless tobacco: Never Used   Substance Use Topics    Alcohol use: No    Drug use: No        No Known Allergies,   Past Medical History:   Diagnosis Date    Acute renal failure ([address] 2015    Anxiety 2021    Calculi, ureter 2015    Carpal tunnel syndrome of left wrist 2018    Chronic back pain     Depression 2015    Headache     rare migraine    Kidney stone     Obesity     Ovarian cyst    ,   Past Surgical History:   Procedure Laterality Date    KIDNEY STONE SURGERY Left     TUBAL LIGATION         PHYSICAL EXAMINATION:  [ INSTRUCTIONS:  \"[x]\" Indicates a positive item  \"[]\" Indicates a negative item  -- DELETE ALL ITEMS NOT EXAMINED]  Vital Signs: (As obtained by patient/caregiver or practitioner observation)    Blood pressure-  Heart rate-    Respiratory rate-    Temperature-  Pulse oximetry- Constitutional: [x] Appears well-developed and well-nourished [x] No apparent distress      [] Abnormal-   Mental status  [x] Alert and awake  [x] Oriented to person/place/time [x]Able to follow commands      Eyes:  EOM    [x]  Normal  [] Abnormal-  Sclera  [x]  Normal  [] Abnormal -         Discharge []  None visible  [] Abnormal -    HENT:   [] Normocephalic, atraumatic. [] Abnormal   [] Mouth/Throat: Mucous membranes are moist.     External Ears [] Normal  [] Abnormal-     Neck: [] No visualized mass     Pulmonary/Chest: [] Respiratory effort normal.  [] No visualized signs of difficulty breathing or respiratory distress        [] Abnormal-      Musculoskeletal:   [] Normal gait with no signs of ataxia         [x] Normal range of motion of neck        [] Abnormal-       Neurological:        [x] No Facial Asymmetry (Cranial nerve 7 motor function) (limited exam to video visit)          [x] No gaze palsy        [] Abnormal-         Skin:        [x] No significant exanthematous lesions or discoloration noted on facial skin         [] Abnormal-            Psychiatric:       [x] Normal Affect [x] No Hallucinations        [] Abnormal-     Other pertinent observable physical exam findings-     ASSESSMENT/PLAN:  1. Moderate episode of recurrent major depressive disorder (HCC)  We will start patient on Lexapro  Side effects explained  If any side effects to call back. 2. Anxiety  Patient has anxiety will try Vistaril as needed    3. For headaches take Tylenol if he did get worse needs further testing      Return to office in a month      No follow-ups on file. Niraj Gee is a 51 y.o. female being evaluated by a Virtual Visit (video visit) encounter to address concerns as mentioned above. A caregiver was present when appropriate. Due to this being a TeleHealth encounter (During [facility] public health emergency), evaluation of the following organ systems was limited: Vitals/Constitutional/EENT/Resp/CV/GI//MS/Neuro/Skin/Heme-Lymph-Imm. Pursuant to the emergency declaration under the [address] [place] [address] and the [employer] Act, this Virtual Visit was conducted with patient's (and/or legal guardian's) consent, to reduce the patient's risk of exposure to COVID-19 and provide necessary medical care. The patient (and/or legal guardian) has also been advised to contact this office for worsening conditions or problems, and seek emergency medical treatment and/or call 911 if deemed necessary. Patient identification was verified at the start of the visit: Yes    Total time spent on this encounter: Not billed by time    Services were provided through a video synchronous discussion virtually to substitute for in-person clinic visit. Patient and provider were located at their individual homes. --Dave Owens MD on 1/18/2021 at 3:45 PM    An electronic signature was used to authenticate this note.

## 2021-01-24 PROBLEM — M25.522 BILATERAL ELBOW JOINT PAIN: Status: RESOLVED | Noted: 2019-06-21 | Resolved: 2021-01-24

## 2021-01-24 PROBLEM — M25.521 BILATERAL ELBOW JOINT PAIN: Status: RESOLVED | Noted: 2019-06-21 | Resolved: 2021-01-24

## 2021-01-24 PROBLEM — F41.9 ANXIETY: Status: ACTIVE | Noted: 2021-01-24

## 2021-11-29 ENCOUNTER — OFFICE VISIT (OUTPATIENT)
Dept: INTERNAL MEDICINE CLINIC | Age: 46
End: 2021-11-29
Payer: COMMERCIAL

## 2021-11-29 VITALS
HEART RATE: 68 BPM | WEIGHT: 223.8 LBS | RESPIRATION RATE: 16 BRPM | TEMPERATURE: 98.1 F | BODY MASS INDEX: 35.12 KG/M2 | DIASTOLIC BLOOD PRESSURE: 82 MMHG | OXYGEN SATURATION: 98 % | HEIGHT: 67 IN | SYSTOLIC BLOOD PRESSURE: 130 MMHG

## 2021-11-29 DIAGNOSIS — F43.21 GRIEF: ICD-10-CM

## 2021-11-29 DIAGNOSIS — F41.9 ANXIETY: ICD-10-CM

## 2021-11-29 DIAGNOSIS — F33.1 MODERATE EPISODE OF RECURRENT MAJOR DEPRESSIVE DISORDER (HCC): Primary | ICD-10-CM

## 2021-11-29 PROCEDURE — 99213 OFFICE O/P EST LOW 20 MIN: CPT | Performed by: INTERNAL MEDICINE

## 2021-11-29 PROCEDURE — G8484 FLU IMMUNIZE NO ADMIN: HCPCS | Performed by: INTERNAL MEDICINE

## 2021-11-29 PROCEDURE — 1036F TOBACCO NON-USER: CPT | Performed by: INTERNAL MEDICINE

## 2021-11-29 PROCEDURE — G8417 CALC BMI ABV UP PARAM F/U: HCPCS | Performed by: INTERNAL MEDICINE

## 2021-11-29 PROCEDURE — G8427 DOCREV CUR MEDS BY ELIG CLIN: HCPCS | Performed by: INTERNAL MEDICINE

## 2021-11-29 RX ORDER — HYDROXYZINE PAMOATE 25 MG/1
25 CAPSULE ORAL 2 TIMES DAILY PRN
Qty: 30 CAPSULE | Refills: 0 | Status: SHIPPED | OUTPATIENT
Start: 2021-11-29

## 2021-11-29 RX ORDER — ESCITALOPRAM OXALATE 10 MG/1
10 TABLET ORAL DAILY
Qty: 30 TABLET | Refills: 3 | Status: SHIPPED | OUTPATIENT
Start: 2021-11-29 | End: 2022-05-27

## 2021-11-29 NOTE — LETTER
[address] [place] [address] Internal Med  [address] [place] [id number]  Phone: [phone]  Fax: [phone]    Penny Villeda MD        November 29, 2021     Patient: Deonna Rutherford   YOB: 1975   Date of Visit: 11/29/2021       To Whom It May Concern: It is my medical opinion that Deonna Rutherford may return to work on 12/13/2021. If you have any questions or concerns, please don't hesitate to call.     Sincerely,        Penny Villeda MD

## 2021-11-29 NOTE — PROGRESS NOTES
Beena Garcia  Patient's  is 1975  Seen in office on 2021      SUBJECTIVE:  Hina pichardo 51 y. o.year old female presents today   Chief Complaint   Patient presents with    Depression     crying all of the time, lost her boyfriend Friday    Anorexia    Insomnia    Other     wants fmla to be filled out for work, cannot go back to work in a week     Patient is complaining of depression  His long-term boyfriend  3 days ago. Patient is here with her sister. Patient is having crying spells and is still crying from the office. She has anorexia and insomnia. She is not able to work and is requesting an extension. Patient denies any suicidal ideation. Patient states she has support from her sisters    Taking medications regularly. No side effects noted. Review of Systems    OBJECTIVE: /82   Pulse 68   Temp 98.1 °F (36.7 °C) (Oral)   Resp 16   Ht 5' 7\" (1.702 m)   Wt 223 lb 12.8 oz (101.5 kg)   SpO2 98%   BMI 35.05 kg/m²     Wt Readings from Last 3 Encounters:   21 223 lb 12.8 oz (101.5 kg)   19 201 lb 12.8 oz (91.5 kg)   19 208 lb (94.3 kg)       Patient was seen taking COVID-19 precautions. Face mask, gloves were used. Patient also wore facemask. GENERAL:  Alert, oriented, pleasant, in no apparent distress. HEENT:  Conjunctiva pink, no scleral icterus. ENT clear. NECK: Supple. No jugular venous distention noted. No masses felt,  CARDIOVASCULAR:  Normal S1 and S2    PULMONARY:  No respiratory distress. No wheezes or rales. ABDOMEN:  Soft and non-tender,no masses  ororganomegaly. EXTREMITIES:  No cyanosis, clubbing, or significant edema. SKIN: Skin is warm and dry. NEUROLOGICAL:  Cranial nerves II through XII are grossly intact. IMPRESSION:    Encounter Diagnoses   Name Primary?     Moderate episode of recurrent major depressive disorder (HCC) Yes    Anxiety     Grief        ASSESSMENT/PLAN:  Start on lexapro and vistartil  Discussed counseling but declined. Off work for 2 weeks  RTO in 2 weeks. If symptoms get worse call or go to the emergency room        Mediations reviewed with the patient. Continue current medications. Appropriate prescriptions are addressed. After visit summeryprovided. Follow up as directed sooner if needed. Questions answered and patient verbalizes understanding. Call for any problems, questions, or concerns. No Known Allergies  Current Outpatient Medications   Medication Sig Dispense Refill    escitalopram (LEXAPRO) 10 MG tablet Take 1 tablet by mouth daily (Patient not taking: Reported on 11/29/2021) 30 tablet 3    hydrOXYzine (VISTARIL) 25 MG capsule Take 1 capsule by mouth daily as needed for Anxiety (Patient not taking: Reported on 11/29/2021) 30 capsule 0     No current facility-administered medications for this visit.      Past Medical History:   Diagnosis Date    Acute renal failure ([address] 4/23/2015    Anxiety 1/24/2021    Calculi, ureter 4/23/2015    Carpal tunnel syndrome of left wrist 1/30/2018    Chronic back pain     Depression 12/5/2015    Headache     rare migraine    Kidney stone     Obesity     Ovarian cyst      Past Surgical History:   Procedure Laterality Date    KIDNEY STONE SURGERY Left 2015    TUBAL LIGATION       Social History     Tobacco Use    Smoking status: Never Smoker    Smokeless tobacco: Never Used   Substance Use Topics    Alcohol use: No       LAB REVIEW:  CBC:   Lab Results   Component Value Date    WBC 5.5 04/28/2020    HGB 12.2 04/28/2020    HCT 36.0 04/28/2020     04/28/2020     Lipids:   Lab Results   Component Value Date    HDL 41 07/02/2019    LDLCALC 107 07/02/2019    LDLDIRECT 113 (H) 04/21/2018    TRIGLYCFAST 93 07/02/2019    CHOLFAST 167 07/02/2019     Renal:   Lab Results   Component Value Date    BUN 8 07/02/2019    CREATININE 0.89 07/02/2019     07/02/2019    K 3.8 07/02/2019    ALT 14 07/02/2019    AST 20 07/02/2019    GLUCOSE 92 07/02/2019    GLUF 86 04/21/2018     PT/INR: No results found for: INR  A1C:   Lab Results   Component Value Date    LABA1C 5.2 04/21/2018           Julissa Merrill MD, 11/29/2021 , 1:43 PM

## 2022-05-27 ENCOUNTER — TELEMEDICINE (OUTPATIENT)
Dept: INTERNAL MEDICINE CLINIC | Age: 47
End: 2022-05-27
Payer: COMMERCIAL

## 2022-05-27 DIAGNOSIS — J20.8 ACUTE BRONCHITIS DUE TO OTHER SPECIFIED ORGANISMS: ICD-10-CM

## 2022-05-27 PROCEDURE — 99213 OFFICE O/P EST LOW 20 MIN: CPT | Performed by: INTERNAL MEDICINE

## 2022-05-27 PROCEDURE — G8427 DOCREV CUR MEDS BY ELIG CLIN: HCPCS | Performed by: INTERNAL MEDICINE

## 2022-05-27 RX ORDER — AZITHROMYCIN 250 MG/1
TABLET, FILM COATED ORAL
Qty: 1 PACKET | Refills: 0 | Status: SHIPPED | OUTPATIENT
Start: 2022-05-27

## 2022-05-27 RX ORDER — DEXTROMETHORPHAN HYDROBROMIDE AND PROMETHAZINE HYDROCHLORIDE 15; 6.25 MG/5ML; MG/5ML
5 SYRUP ORAL 4 TIMES DAILY PRN
Qty: 180 ML | Refills: 0 | Status: SHIPPED | OUTPATIENT
Start: 2022-05-27

## 2022-05-27 NOTE — PROGRESS NOTES
2022    TELEHEALTH EVALUATION -- Audio/Visual (During [facility]-25 public health emergency)    HPI:    Femi Real (:  1975) has requested an audio/video evaluation for the following concern(s):    Chief Complaint   Patient presents with    Cough     patient has a cough w/prod clear mucus feels it is her allergies has a runny nose      Patient states she is having cough with clear sputum for the last 3 to 4 days. No fever or chills. Has runny nose. Patient states she is fully vaccinated but did not get the booster. No headaches. No dizziness. No shortness of breath. No difficulty breathing. Patient has anxiety and depression when I saw her last time in 2021 after her boyfriend's death. Patient states she is feeling much better now. She is not taking Lexapro but takes hydroxyzine as needed. Overall she is not depressed. No suicidal ideation. No panic attacks. Patient denies any other complaints    Review of Systems    Prior to Visit Medications    Medication Sig Taking?  Authorizing Provider   hydrOXYzine (VISTARIL) 25 MG capsule Take 1 capsule by mouth 2 times daily as needed for Anxiety Yes J Luis Leung MD       Social History     Tobacco Use    Smoking status: Never Smoker    Smokeless tobacco: Never Used   Substance Use Topics    Alcohol use: No    Drug use: No        No Known Allergies    PHYSICAL EXAMINATION:  [ INSTRUCTIONS:  \"[x]\" Indicates a positive item  \"[]\" Indicates a negative item  -- DELETE ALL ITEMS NOT EXAMINED]  Vital Signs: (As obtained by patient/caregiver or practitioner observation)    Blood pressure-  Heart rate-    Respiratory rate-    Temperature-  Pulse oximetry-     Constitutional: [x] Appears well-developed and well-nourished [x] No apparent distress      [] Abnormal-   Mental status  [x] Alert and awake  [x] Oriented to person/place/time [x]Able to follow commands      Eyes:  EOM    []  Normal  [] Abnormal-  Sclera  []  Normal  [] Abnormal - Discharge []  None visible  [] Abnormal -    HENT:   [] Normocephalic, atraumatic. [] Abnormal   [] Mouth/Throat: Mucous membranes are moist.     External Ears [x] Normal  [] Abnormal-     Neck: [x] No visualized mass     Pulmonary/Chest: [x] Respiratory effort normal.  [x] No visualized signs of difficulty breathing or respiratory distress        [] Abnormal-      Musculoskeletal:   [] Normal gait with no signs of ataxia         [] Normal range of motion of neck        [] Abnormal-       Neurological:        [] No Facial Asymmetry (Cranial nerve 7 motor function) (limited exam to video visit)          [] No gaze palsy        [] Abnormal-         Skin:        [] No significant exanthematous lesions or discoloration noted on facial skin         [] Abnormal-            Psychiatric:       [x] Normal Affect [x] No Hallucinations        [] Abnormal-     Other pertinent observable physical exam findings-     ASSESSMENT/PLAN:  1. Acute bronchitis due to other specified organisms  Patient has upper respiratory symptoms and has cough indicating some bronchitis. Patient does not want to get the COVID 19 test.  Advised patient to take precautions and use mask till symptoms resolved  We will treat with Z-Shane and Phenergan DM. If symptoms get worse patient should call back. 2.  Anxiety and depression has resolved. Patient occasionally takes hydroxyzine but is not taking escitalopram.    3.  Grief has improved. Patient wants to come only as needed. Orders Placed This Encounter   Medications    azithromycin (ZITHROMAX) 250 MG tablet     Sig: Take 2 tabs (500 mg) on Day 1, and take 1 tab (250 mg) on days 2 through 5.      Dispense:  1 packet     Refill:  0    promethazine-dextromethorphan (PROMETHAZINE-DM) 6.25-15 MG/5ML syrup     Sig: Take 5 mLs by mouth 4 times daily as needed for Cough     Dispense:  180 mL     Refill:  0       [unfilled]    Alvera Blight, was evaluated through a synchronous (real-time) audio-video encounter. The patient (or guardian if applicable) is aware that this is a billable service, which includes applicable co-pays. This Virtual Visit was conducted with patient's (and/or legal guardian's) consent. The visit was conducted pursuant to the emergency declaration under the [facility], [facility] authority and the [employer] and [employer] General Act. Patient identification was verified, and a caregiver was present when appropriate. The patient was located at Home: [address]. Provider was located at [facility] (Appt Dept): [address] [place] [address]. [address] [place] [address],  [address] [place] [address]. Total time spent on this encounter: Not billed by time    --Jaimee Boss MD on 5/27/2022 at 12:36 PM    An electronic signature was used to authenticate this note.

## 2022-12-28 ENCOUNTER — OFFICE VISIT (OUTPATIENT)
Dept: INTERNAL MEDICINE CLINIC | Age: 47
End: 2022-12-28
Payer: COMMERCIAL

## 2022-12-28 VITALS
OXYGEN SATURATION: 99 % | DIASTOLIC BLOOD PRESSURE: 76 MMHG | SYSTOLIC BLOOD PRESSURE: 124 MMHG | BODY MASS INDEX: 35.05 KG/M2 | HEART RATE: 68 BPM | HEIGHT: 67 IN

## 2022-12-28 DIAGNOSIS — I77.810 THORACIC AORTIC ECTASIA (HCC): ICD-10-CM

## 2022-12-28 DIAGNOSIS — R91.1 PULMONARY NODULE: ICD-10-CM

## 2022-12-28 DIAGNOSIS — F41.9 ANXIETY AND DEPRESSION: Primary | ICD-10-CM

## 2022-12-28 DIAGNOSIS — F32.A ANXIETY AND DEPRESSION: Primary | ICD-10-CM

## 2022-12-28 PROCEDURE — G8417 CALC BMI ABV UP PARAM F/U: HCPCS | Performed by: PHYSICIAN ASSISTANT

## 2022-12-28 PROCEDURE — 1036F TOBACCO NON-USER: CPT | Performed by: PHYSICIAN ASSISTANT

## 2022-12-28 PROCEDURE — 99214 OFFICE O/P EST MOD 30 MIN: CPT | Performed by: PHYSICIAN ASSISTANT

## 2022-12-28 PROCEDURE — G8484 FLU IMMUNIZE NO ADMIN: HCPCS | Performed by: PHYSICIAN ASSISTANT

## 2022-12-28 PROCEDURE — G8428 CUR MEDS NOT DOCUMENT: HCPCS | Performed by: PHYSICIAN ASSISTANT

## 2022-12-28 RX ORDER — ESCITALOPRAM OXALATE 10 MG/1
10 TABLET ORAL DAILY
Qty: 30 TABLET | Refills: 3 | Status: SHIPPED | OUTPATIENT
Start: 2022-12-28

## 2022-12-28 RX ORDER — HYDROXYZINE PAMOATE 25 MG/1
25 CAPSULE ORAL 2 TIMES DAILY PRN
Qty: 30 CAPSULE | Refills: 0 | Status: SHIPPED | OUTPATIENT
Start: 2022-12-28

## 2022-12-28 NOTE — PROGRESS NOTES
Tg Graf (:  1975) is a 49 y.o. female,Established patient, here for evaluation of the following chief complaint(s):    No chief complaint on file. This is my first patient encounter with Tg Graf; chart reviewed. SUBJECTIVE/OBJECTIVE:  HPI  Tg Graf is a pleasant 49 y.o. female presenting to clinic today for ER follow-up/chest pain/anxiety and depression. .   ER follow-up chest pain-patient was seen in emergency department on 2022; CT PE was negative for pulmonary embolism, did show a 3 mm noncalcified left upper lobe nodule; there was ectasia of the ascending thoracic aorta. Patient's dates that she believes her ER visit was resolved and anxiety and panic. Patient reports significant ongoing depression symptoms for several years which has worsened recently with passing of fiancé; patient reports she has difficulty concentrating due to anxiety; reports somewhat agitated behavior; reports low energy, wanting to sleep a lot etc.  Patient denies SI or HI at this time. Patient was previously on Lexapro however only took this for 1 week; reports she states she felt like she may have gotten some benefit from this and would like to reinitiate. EKG:Rhythm sinus bradycardia    QTc 398  ST Segments no ST elevations or depressions are noted  T Waves inverted AVR and V1       CT PE:  IMPRESSION:   1. Negative for pulmonary embolism, consolidation or effusion. 2. Ectasia of the ascending thoracic aorta. 3. Sequelae of granulomatous disease. 4. 3 mm noncalcified left upper lobe pulmonary nodule. Per Fleischner   society guidelines in a low risk patient no additional follow-up is   required. In a high risk patient follow-up chest CT is recommended in   12 months.   No Known Allergies    Current Outpatient Medications   Medication Sig Dispense Refill    hydrOXYzine pamoate (VISTARIL) 25 MG capsule Take 1 capsule by mouth 2 times daily as needed for Anxiety 30 capsule 0 escitalopram (LEXAPRO) 10 MG tablet Take 1 tablet by mouth daily 30 tablet 3     No current facility-administered medications for this visit. /76   Pulse 68   Ht 5' 7\" (1.702 m)   SpO2 99%   BMI 35.05 kg/m²     Review of Systems   Constitutional:  Negative for appetite change, chills, fatigue and fever. HENT:  Negative for congestion, ear pain, hearing loss, rhinorrhea and sore throat. Eyes:  Negative for photophobia, pain, discharge and redness. Respiratory:  Negative for cough, chest tightness, shortness of breath and wheezing. Cardiovascular:  Positive for chest pain. Negative for palpitations and leg swelling. Gastrointestinal:  Negative for abdominal pain, blood in stool, constipation, diarrhea, nausea and vomiting. Endocrine: Negative for polyuria. Genitourinary:  Negative for difficulty urinating, dysuria, flank pain, frequency, hematuria and urgency. Musculoskeletal:  Negative for arthralgias, back pain, gait problem and joint swelling. Skin:  Negative for color change and rash. Neurological:  Negative for dizziness, syncope, weakness, light-headedness and headaches. Hematological:  Negative for adenopathy. Psychiatric/Behavioral:  Positive for agitation, decreased concentration and dysphoric mood. Negative for behavioral problems and suicidal ideas. The patient is nervous/anxious. Physical Exam  HENT:      Head: Normocephalic and atraumatic. Right Ear: External ear normal.      Left Ear: External ear normal.   Cardiovascular:      Rate and Rhythm: Regular rhythm. Pulses: Normal pulses. Heart sounds: No murmur heard. Pulmonary:      Effort: No respiratory distress. Musculoskeletal:         General: Normal range of motion. Skin:     General: Skin is warm and dry. Neurological:      General: No focal deficit present. Mental Status: She is alert and oriented to person, place, and time. Mental status is at baseline.    Psychiatric: Behavior: Behavior normal.       ASSESSMENT/PLAN:  1. Anxiety and depression   -Shared decision making taken place; patient would like to reinitiate Lexapro; reviewed proper use, monitoring, side effects; recheck in 4 to 6 weeks for consideration of dose adjustment, new medication etc.  Patient declines referral to behavioral health; stressed importance of routine exercise, healthy diet etc. Return to clinic or report to emergency department if symptoms worsen, change, persist.    -     hydrOXYzine pamoate (VISTARIL) 25 MG capsule; Take 1 capsule by mouth 2 times daily as needed for Anxiety, Disp-30 capsule, R-0Normal  -     escitalopram (LEXAPRO) 10 MG tablet; Take 1 tablet by mouth daily, Disp-30 tablet, R-3Normal  2. Thoracic aortic ectasia (HCC)   -No murmurs noted on exam; patient would like further investigation into issues; may benefit from further work-up/echo etc.; referral placed to cardiology. [facility], Viola Hoang  3. Pulmonary nodule   -Repeat CT in 1 year. Return in about 4 weeks (around 1/25/2023), or if symptoms worsen or fail to improve, for Follow Up. An electronic signature was used to authenticate this note.     --JANIE Garcia

## 2022-12-28 NOTE — LETTER
[address] [facility]  [facility]  Phone: [phone]  Fax: [phone]    Derrick Wallace        December 28, 2022     Patient: Sofy Hughes   YOB: 1975   Date of Visit: 12/28/2022       To Whom It May Concern: It is my medical opinion that Sofy Hughes was seen in clinic today and can return to work 01/03/2022. If you have any questions or concerns, please don't hesitate to call.     Sincerely,        Mckay Hoyos PA-C

## 2022-12-29 ASSESSMENT — ENCOUNTER SYMPTOMS
NAUSEA: 0
CONSTIPATION: 0
COUGH: 0
EYE REDNESS: 0
SHORTNESS OF BREATH: 0
RHINORRHEA: 0
DIARRHEA: 0
VOMITING: 0
PHOTOPHOBIA: 0
EYE PAIN: 0
ABDOMINAL PAIN: 0
BLOOD IN STOOL: 0
COLOR CHANGE: 0
EYE DISCHARGE: 0
SORE THROAT: 0
BACK PAIN: 0
WHEEZING: 0
CHEST TIGHTNESS: 0

## 2023-01-19 ENCOUNTER — INITIAL CONSULT (OUTPATIENT)
Dept: CARDIOLOGY CLINIC | Age: 48
End: 2023-01-19
Payer: COMMERCIAL

## 2023-01-19 VITALS
DIASTOLIC BLOOD PRESSURE: 80 MMHG | SYSTOLIC BLOOD PRESSURE: 110 MMHG | RESPIRATION RATE: 16 BRPM | BODY MASS INDEX: 29.4 KG/M2 | HEIGHT: 68 IN | HEART RATE: 55 BPM | WEIGHT: 194 LBS

## 2023-01-19 DIAGNOSIS — E78.5 DYSLIPIDEMIA: ICD-10-CM

## 2023-01-19 DIAGNOSIS — I77.819 ECTATIC AORTA (HCC): ICD-10-CM

## 2023-01-19 DIAGNOSIS — R07.2 PRECORDIAL PAIN: ICD-10-CM

## 2023-01-19 DIAGNOSIS — R07.89 CHEST PRESSURE: Primary | ICD-10-CM

## 2023-01-19 DIAGNOSIS — F41.9 ANXIETY: ICD-10-CM

## 2023-01-19 PROCEDURE — G8484 FLU IMMUNIZE NO ADMIN: HCPCS | Performed by: INTERNAL MEDICINE

## 2023-01-19 PROCEDURE — G8417 CALC BMI ABV UP PARAM F/U: HCPCS | Performed by: INTERNAL MEDICINE

## 2023-01-19 PROCEDURE — 93000 ELECTROCARDIOGRAM COMPLETE: CPT | Performed by: INTERNAL MEDICINE

## 2023-01-19 PROCEDURE — 99204 OFFICE O/P NEW MOD 45 MIN: CPT | Performed by: INTERNAL MEDICINE

## 2023-01-19 PROCEDURE — G8427 DOCREV CUR MEDS BY ELIG CLIN: HCPCS | Performed by: INTERNAL MEDICINE

## 2023-01-19 NOTE — PROGRESS NOTES
CARDIOLOGY  NOTE    Chief Complaint: Abnormal CT Chest     HPI:   Hannah Grijalva is a 51y.o. year old who has Past medical history as noted below. Hannah Grijalva was seen in the emergency department recently due to episode of chest pressure pain tingling numbness shortness of breath she was thought to have panic attack or anxiety. During work-up she had a CT chest which revealed ectatic thoracic aorta at 3.9 cm. She was started on anxiety medication and is feeling better now. She does report family history of heart problems denies any palpitations  She has lost weight after cutting down on soda and caffeine intake  He works as a   He also has a lung nodule , history of smoking but currently only we have been      Current Outpatient Medications   Medication Sig Dispense Refill    hydrOXYzine pamoate (VISTARIL) 25 MG capsule Take 1 capsule by mouth 2 times daily as needed for Anxiety 30 capsule 0    escitalopram (LEXAPRO) 10 MG tablet Take 1 tablet by mouth daily 30 tablet 3     No current facility-administered medications for this visit. Allergies:   Patient has no known allergies.     Patient History:  Past Medical History:   Diagnosis Date    Acute renal failure ([address] 4/23/2015    Anxiety 1/24/2021    Calculi, ureter 4/23/2015    Carpal tunnel syndrome of left wrist 1/30/2018    Chronic back pain     Depression 12/5/2015    Headache     rare migraine    Kidney stone     Obesity     Ovarian cyst      Past Surgical History:   Procedure Laterality Date    KIDNEY STONE SURGERY Left 2015    TUBAL LIGATION       Family History   Problem Relation Age of Onset    Heart Failure Mother     High Blood Pressure Mother     Hypertension Father     COPD Father     Cancer Sister         ovarian    Depression Sister     Anxiety Disorder Sister     Diabetes Brother     Heart Failure Sister     Other Sister         partial hysterectomy for menorrhagia    Anxiety Disorder Sister Breast Cancer Sister     No Known Problems Maternal Grandmother     No Known Problems Maternal Grandfather     No Known Problems Paternal Grandmother     Heart Attack Paternal Grandfather     Alcohol Abuse Brother     No Known Problems Sister      Social History     Tobacco Use    Smoking status: Never    Smokeless tobacco: Never   Substance Use Topics    Alcohol use: No        Review of Systems:   Constitutional: No Fever or Weight Loss   Eyes: No Decreased Vision  ENT: No Headaches, Hearing Loss or Vertigo  Cardiovascular: as per note above   Respiratory: No cough or wheezing and as per note above. Gastrointestinal: No abdominal pain, appetite loss, blood in stools, constipation, diarrhea or heartburn  Genitourinary: No dysuria, trouble voiding, or hematuria  Musculoskeletal:  denies any new  joint aches , swelling  or pain   Integumentary: No rash or pruritis  Neurological: No TIA or stroke symptoms  Psychiatric: No anxiety or depression  Endocrine: No malaise, fatigue or temperature intolerance  Hematologic/Lymphatic: No bleeding problems, blood clots or swollen lymph nodes  Allergic/Immunologic: No nasal congestion or hives    Objective:      Physical Exam:  /80 (Position: Standing)   Pulse 55   Resp 16   Ht 5' 8\" (1.727 m)   Wt 194 lb (88 kg)   BMI 29.50 kg/m²   Wt Readings from Last 3 Encounters:   01/19/23 194 lb (88 kg)   11/29/21 223 lb 12.8 oz (101.5 kg)   07/25/19 201 lb 12.8 oz (91.5 kg)     Body mass index is 29.5 kg/m². Vitals:    01/19/23 1550   BP: 110/80   Pulse:    Resp:         General Appearance:  No distress, conversant  Constitutional:  Well developed, Well nourished, No acute distress, Non-toxic appearance. HENT:  Normocephalic, Atraumatic, Bilateral external ears normal, Oropharynx moist, No oral exudates, Nose normal. Neck- Normal range of motion, No tenderness, Supple, No stridor,no apical-carotid delay  Eyes:  PERRL, EOMI, Conjunctiva normal, No discharge.    Respiratory: Normal breath sounds, No respiratory distress, No wheezing, No chest tenderness. ,no use of accessory muscles, NO crackles  Cardiovascular: (PMI) apex non displaced,no lifts no thrills,S1 and S2 audible, No added heart sounds, No signs of ankle edema, or volume overload, No evidence of JVD, No crackles   GI:  Bowel sounds normal, Soft, No tenderness, No masses, No gross visceromegaly   :  No costovertebral angle tenderness   Musculoskeletal:  No edema, no tenderness, no deformities. Back- no tenderness  Integument:  Well hydrated, no rash   Lymphatic:  No lymphadenopathy noted   Neurologic:  Alert & oriented x 3, CN 2-12 normal, normal motor function, normal sensory function, no focal deficits noted   Psychiatric:  Speech and behavior appropriate       Medical decision making and Data review:  DATA:  No results found for: TROPONINT  BNP:  No results found for: PROBNP  PT/INR:  No results found for: PTINR  Lab Results   Component Value Date    LABA1C 5.2 04/21/2018     Lab Results   Component Value Date    HDL 41 07/02/2019    LDLCALC 107 07/02/2019    LDLDIRECT 113 (H) 04/21/2018     Lab Results   Component Value Date    ALT 14 07/02/2019    AST 20 07/02/2019     No results for input(s): WBC, HGB, HCT, MCV, PLT in the last 72 hours. TSH:   Lab Results   Component Value Date    TSH 0.99 07/02/2019     Lab Results   Component Value Date    AST 20 07/02/2019    ALT 14 07/02/2019    BILITOT 0.3 07/02/2019    ALKPHOS 85 07/02/2019     No results found for: PROBNP  Lab Results   Component Value Date    LABA1C 5.2 04/21/2018     Lab Results   Component Value Date    WBC 5.5 04/28/2020    HGB 12.2 04/28/2020    HCT 36.0 04/28/2020     04/28/2020     All labs, medications and tests reviewed by myself including data and history from outside source , patient and available family . Assessment & Plan:      1. Chest pressure    2. Dyslipidemia    3. Anxiety    4. Ectatic aorta ([address]    5.  Precordial pain         Ectatic aorta [facility])  Her CT scan in 2023 January showed ectatic ascending aorta of 3.9 cm we went over the diagnosis and findings we will repeat echo and follow-up in 6 months and then once a year    Precordial pain   Episode of chest pain and palpitations which were in the setting of possible anxiety panic attacks? Not restratification  We will get stress test to see if he unmask any arrhythmias     Dyslipidemia :  All available lab work was reviewed. Patient was advised to repeat lab work before next visit. Necessary orders were placed , instructions given by myself       Counseled extensively and medication compliance urged. We discussed that for the  prevention of ASCVD our  goal is aggressive risk modification. Patient is encouraged to exercise if they can , educated about  brisk walk for 30 minutes  at least 3 to 4 times a week if there are no physical limitations  Various goals were discussed and questions answered. Continue current medications. Appropriate prescriptions are addressed and refills ordered. Questions answered and patient verbalizes understanding. Call for any problems, questions, or concerns. Greater than 60 % of time spent counseling besides reviewing data and images     Continue all other medications of all above medical condition listed as is. Return in about 1 month (around 2/19/2023). Please note this report has been partially produced using speech recognition software and may contain errors related to that system including errors in grammar, punctuation, and spelling, as well as words and phrases that may be inappropriate. If there are any questions or concerns please feel free to contact the dictating provider for clarification.

## 2023-01-19 NOTE — ASSESSMENT & PLAN NOTE
Episode of chest pain and palpitations which were in the setting of possible anxiety panic attacks? Not restratification  We will get stress test to see if he unmask any arrhythmias

## 2023-01-19 NOTE — ASSESSMENT & PLAN NOTE
Her CT scan in 2023 January showed ectatic ascending aorta of 3.9 cm we went over the diagnosis and findings we will repeat echo and follow-up in 6 months and then once a year

## 2023-01-24 ENCOUNTER — TELEPHONE (OUTPATIENT)
Dept: INTERNAL MEDICINE CLINIC | Age: 48
End: 2023-01-24

## 2023-01-24 NOTE — TELEPHONE ENCOUNTER
Called patient message left for patient to contact office to change appointment 1/25/23 to vv or r/s

## 2023-02-28 ENCOUNTER — PROCEDURE VISIT (OUTPATIENT)
Dept: CARDIOLOGY CLINIC | Age: 48
End: 2023-02-28
Payer: COMMERCIAL

## 2023-02-28 DIAGNOSIS — I77.819 ECTATIC AORTA (HCC): ICD-10-CM

## 2023-02-28 DIAGNOSIS — E78.5 DYSLIPIDEMIA: ICD-10-CM

## 2023-02-28 DIAGNOSIS — R07.89 CHEST PRESSURE: ICD-10-CM

## 2023-02-28 DIAGNOSIS — F41.9 ANXIETY: ICD-10-CM

## 2023-02-28 PROCEDURE — 93015 CV STRESS TEST SUPVJ I&R: CPT | Performed by: INTERNAL MEDICINE

## 2023-02-28 NOTE — PROGRESS NOTES
GXT Completed. Instructed her that Dr. Eliseo Zeng will look at the test results and advise she asked what that ment I informed her that he will determine if different testing is needed. She asked me what I ment informed her that maybe a cardiolite stress test. She asked if needles were involved and I informed her yes she put on her coat and left. Saying no way.

## 2023-03-08 ENCOUNTER — PROCEDURE VISIT (OUTPATIENT)
Dept: CARDIOLOGY CLINIC | Age: 48
End: 2023-03-08
Payer: COMMERCIAL

## 2023-03-08 DIAGNOSIS — I77.819 ECTATIC AORTA (HCC): ICD-10-CM

## 2023-03-08 DIAGNOSIS — F41.9 ANXIETY: ICD-10-CM

## 2023-03-08 DIAGNOSIS — R07.89 CHEST PRESSURE: Primary | ICD-10-CM

## 2023-03-08 LAB
LV EF: 58 %
LVEF MODALITY: NORMAL

## 2023-03-08 PROCEDURE — 93306 TTE W/DOPPLER COMPLETE: CPT | Performed by: INTERNAL MEDICINE

## 2023-03-14 ENCOUNTER — OFFICE VISIT (OUTPATIENT)
Dept: INTERNAL MEDICINE CLINIC | Age: 48
End: 2023-03-14
Payer: COMMERCIAL

## 2023-03-14 ENCOUNTER — TELEPHONE (OUTPATIENT)
Dept: CARDIOLOGY CLINIC | Age: 48
End: 2023-03-14

## 2023-03-14 VITALS
OXYGEN SATURATION: 97 % | BODY MASS INDEX: 29.4 KG/M2 | SYSTOLIC BLOOD PRESSURE: 120 MMHG | HEIGHT: 68 IN | WEIGHT: 194 LBS | DIASTOLIC BLOOD PRESSURE: 70 MMHG | HEART RATE: 62 BPM

## 2023-03-14 DIAGNOSIS — F41.9 ANXIETY AND DEPRESSION: ICD-10-CM

## 2023-03-14 DIAGNOSIS — F41.9 ANXIETY: ICD-10-CM

## 2023-03-14 DIAGNOSIS — F32.A ANXIETY AND DEPRESSION: ICD-10-CM

## 2023-03-14 DIAGNOSIS — R07.2 PRECORDIAL PAIN: Primary | ICD-10-CM

## 2023-03-14 DIAGNOSIS — I77.819 ECTATIC AORTA (HCC): ICD-10-CM

## 2023-03-14 DIAGNOSIS — F33.1 MODERATE EPISODE OF RECURRENT MAJOR DEPRESSIVE DISORDER (HCC): ICD-10-CM

## 2023-03-14 PROBLEM — R10.31 RIGHT LOWER QUADRANT ABDOMINAL PAIN: Status: RESOLVED | Noted: 2019-07-25 | Resolved: 2023-03-14

## 2023-03-14 PROBLEM — G56.02 CARPAL TUNNEL SYNDROME OF LEFT WRIST: Status: RESOLVED | Noted: 2018-01-30 | Resolved: 2023-03-14

## 2023-03-14 PROBLEM — F43.21 GRIEF: Status: RESOLVED | Noted: 2021-11-29 | Resolved: 2023-03-14

## 2023-03-14 PROBLEM — J20.8 ACUTE BRONCHITIS DUE TO OTHER SPECIFIED ORGANISMS: Status: RESOLVED | Noted: 2022-05-27 | Resolved: 2023-03-14

## 2023-03-14 PROBLEM — R45.86 MOOD SWINGS: Status: RESOLVED | Noted: 2017-08-01 | Resolved: 2023-03-14

## 2023-03-14 PROCEDURE — 1036F TOBACCO NON-USER: CPT | Performed by: INTERNAL MEDICINE

## 2023-03-14 PROCEDURE — G8484 FLU IMMUNIZE NO ADMIN: HCPCS | Performed by: INTERNAL MEDICINE

## 2023-03-14 PROCEDURE — 99213 OFFICE O/P EST LOW 20 MIN: CPT | Performed by: INTERNAL MEDICINE

## 2023-03-14 PROCEDURE — G8417 CALC BMI ABV UP PARAM F/U: HCPCS | Performed by: INTERNAL MEDICINE

## 2023-03-14 PROCEDURE — G8427 DOCREV CUR MEDS BY ELIG CLIN: HCPCS | Performed by: INTERNAL MEDICINE

## 2023-03-14 RX ORDER — HYDROXYZINE PAMOATE 25 MG/1
25 CAPSULE ORAL 2 TIMES DAILY PRN
Qty: 30 CAPSULE | Refills: 0 | Status: SHIPPED | OUTPATIENT
Start: 2023-03-14

## 2023-03-14 RX ORDER — ESCITALOPRAM OXALATE 10 MG/1
10 TABLET ORAL DAILY
Qty: 30 TABLET | Refills: 3 | Status: SHIPPED | OUTPATIENT
Start: 2023-03-14

## 2023-03-14 NOTE — TELEPHONE ENCOUNTER
Called patient left a message with the results of her echo. EF 55-60%. Dilatation of the aortic root measuring 4.2 cm. Dilatation of the ascending aorta measuring 4.2 cm.  Doppler evaluation reveals mild aortic, mitral, and tricuspid regurgitation

## 2023-03-14 NOTE — PROGRESS NOTES
Kerri Kaufman  Patient's  is 1975  Seen in office on 3/14/2023      SUBJECTIVE:  Danisha Dubon kylee 51 y. o.year old female presents today   Chief Complaint   Patient presents with    Follow-up    Migraine     Stated she is getting at least once a week - makes her vomit would like medication  for migraine    Panic Attack     Patient is having more anxiety attacks     Have not seen patient for several months  Patient came to walk-in clinic with a complaints of anxiety and chest pain. Her medications were renewed escitalopram 10 mg daily and hydroxyzine 25 mg twice daily as needed. Patient states she has been taking the medications. She had chest pain and was referred to cardiologist.  Patient has gated stress test which was abnormal and patient was told she may need nuclear medicine stress test and needs an IV line. Patient states she cannot have IV and then she walked out    Her echocardiogram showed aortic root dilatation 4.2 cm    Patient has not seen a cardiologist in follow-up. Advised patient to see and discuss the results    Patient denies any chest pain. No shortness of breath no cough or sputum production. No abdominal pain. No nausea vomiting or diarrhea. Patient is anxiety in control with escitalopram and hydroxyzine. Taking medications regularly. No side effects noted. Review of Systems  Review of system normal except as in HPI  OBJECTIVE: /70   Pulse 62   Ht 5' 8\" (1.727 m)   Wt 194 lb (88 kg)   SpO2 97%   BMI 29.50 kg/m²     Wt Readings from Last 3 Encounters:   23 194 lb (88 kg)   23 194 lb (88 kg)   21 223 lb 12.8 oz (101.5 kg)      GENERAL: - Alert, oriented, pleasant, in no apparent distress. HEENT: - Conjunctiva pink, no scleral icterus. ENT clear. NECK: -Supple. No jugular venous distention noted. No masses felt,  CARDIOVASCULAR: - Normal S1 and S2    PULMONARY: - No respiratory distress. No wheezes or rales.     ABDOMEN: - Soft and non-tender,no masses  ororganomegaly. EXTREMITIES: - No cyanosis, clubbing, or significant edema. SKIN: Skin is warm and dry. NEUROLOGICAL: - Cranial nerves II through XII are grossly intact. IMPRESSION:    Encounter Diagnoses   Name Primary? Precordial pain Yes    Anxiety and depression     Ectatic aorta (HCC)     Moderate episode of recurrent major depressive disorder (HCC)     Anxiety        ASSESSMENT/PLAN:    1. Precordial pain  Overview:  Patient was referred to cardiologist  GXT stress test was abnormal.  Echo showed 4.2 cm dilatation of the aorta  Advised patient to see cardiologist and follow-up. Patient denies any chest pain or shortness of breath       2. Anxiety and depression  -     escitalopram (LEXAPRO) 10 MG tablet; Take 1 tablet by mouth daily, Disp-30 tablet, R-3Normal  -     hydrOXYzine pamoate (VISTARIL) 25 MG capsule; Take 1 capsule by mouth 2 times daily as needed for Anxiety, Disp-30 capsule, R-0Normal    3. Ectatic aorta (HCC) ultrasound showed aorta slightly dilated. 4. Moderate episode of recurrent major depressive disorder ([facility] [address]  Overview:  Patient is on Lexapro. Assessment & Plan:  As above  5. Anxiety  Overview:  History of anxiety. Continue escitalopram and Vistaril  Assessment & Plan:  Continue above treatment    No orders of the defined types were placed in this encounter. Return to office in 3 months. Mediations reviewed with the patient. Continue current medications. Appropriate prescriptions are addressed. After visit summeryprovided. Follow up as directed sooner if needed. Questions answered and patient verbalizes understanding. Call for any problems, questions, or concerns.        No Known Allergies  Current Outpatient Medications   Medication Sig Dispense Refill    hydrOXYzine pamoate (VISTARIL) 25 MG capsule Take 1 capsule by mouth 2 times daily as needed for Anxiety 30 capsule 0    escitalopram (LEXAPRO) 10 MG tablet Take 1 tablet by mouth daily 30 tablet 3     No current facility-administered medications for this visit. Past Medical History:   Diagnosis Date    Acute renal failure ([address] 04/23/2015    Anxiety 01/24/2021    Calculi, ureter 04/23/2015    Carpal tunnel syndrome of left wrist 01/30/2018    Chronic back pain     Depression 12/05/2015    H/O echocardiogram 03/08/2023    EF 55-60%. Dilatation of the aortic root measuring 4.2 cm. Dilatation of the ascending aorta measuring 4.2 cm.  Doppler evaluation reveals mild aortic, mitral, and tricuspid regurgitation    Headache     rare migraine    Kidney stone     Obesity     Ovarian cyst      Past Surgical History:   Procedure Laterality Date    KIDNEY STONE SURGERY Left 2015    TUBAL LIGATION       Social History     Tobacco Use    Smoking status: Never    Smokeless tobacco: Never   Substance Use Topics    Alcohol use: No       LAB REVIEW:  CBC:   Lab Results   Component Value Date/Time    WBC 5.5 04/28/2020 12:00 AM    HGB 12.2 04/28/2020 12:00 AM    HCT 36.0 04/28/2020 12:00 AM     04/28/2020 12:00 AM     Lipids:   Lab Results   Component Value Date    HDL 41 07/02/2019    LDLCALC 107 07/02/2019    LDLDIRECT 113 (H) 04/21/2018    TRIGLYCFAST 93 07/02/2019    CHOLFAST 167 07/02/2019     Renal:   Lab Results   Component Value Date/Time    BUN 8 07/02/2019 12:00 AM    CREATININE 0.89 07/02/2019 12:00 AM     07/02/2019 12:00 AM    K 3.8 07/02/2019 12:00 AM    ALT 14 07/02/2019 12:00 AM    AST 20 07/02/2019 12:00 AM    GLUCOSE 92 07/02/2019 12:00 AM    GLUF 86 04/21/2018 08:15 AM     PT/INR: No results found for: INR  A1C:   Lab Results   Component Value Date    LABA1C 5.2 04/21/2018           Beronica Moses MD, 3/14/2023 , 5:27 PM

## 2023-06-22 ENCOUNTER — TELEPHONE (OUTPATIENT)
Dept: INTERNAL MEDICINE CLINIC | Age: 48
End: 2023-06-22

## 2023-06-22 NOTE — TELEPHONE ENCOUNTER
Called patient back d/t no hospital visit to see how the patient is doing. No answer, did leave VM with return call back number.

## 2023-06-22 NOTE — TELEPHONE ENCOUNTER
Received call on triage phone. Pt is c/o chest pain and shakiness like an anxiety attack. Pt was advised to go to the ER d/t symptoms. Pt did voice that she did not want to go to the ER, educated patient that d/t these symptoms she does need to report to the ER, pt did voice understanding but did not confirm that she was going to go to the ER.

## 2023-08-31 DIAGNOSIS — F32.A ANXIETY AND DEPRESSION: ICD-10-CM

## 2023-08-31 DIAGNOSIS — F41.9 ANXIETY AND DEPRESSION: ICD-10-CM

## 2023-08-31 NOTE — TELEPHONE ENCOUNTER
----- Message from Woody Schmidt sent at 8/31/2023  3:54 PM EDT -----  Subject: Message to Provider    QUESTIONS  Information for Provider? Patient cancelled appt for 08/31/23 due to being   busy. Rescheduled for 09/12/23, however will be out of her escitalopram   (LEXAPRO) 10 MG tablets and is requesting a prescription for 12 capsules   until she is seen at her upcoming appointment. Please call patient to   advise.   ---------------------------------------------------------------------------  --------------  Mary VALENZUELA  [phone]; OK to leave message on voicemail  ---------------------------------------------------------------------------  --------------  SCRIPT ANSWERS  Relationship to Patient?  Self

## 2023-09-01 RX ORDER — ESCITALOPRAM OXALATE 10 MG/1
10 TABLET ORAL DAILY
Qty: 30 TABLET | Refills: 0 | Status: SHIPPED | OUTPATIENT
Start: 2023-09-01

## 2023-09-08 ENCOUNTER — TELEPHONE (OUTPATIENT)
Dept: INTERNAL MEDICINE CLINIC | Age: 48
End: 2023-09-08

## 2023-09-14 ENCOUNTER — COMMUNITY OUTREACH (OUTPATIENT)
Dept: INTERNAL MEDICINE CLINIC | Age: 48
End: 2023-09-14

## 2024-02-08 ENCOUNTER — TELEPHONE (OUTPATIENT)
Dept: INTERNAL MEDICINE CLINIC | Age: 49
End: 2024-02-08

## 2024-02-22 ENCOUNTER — OFFICE VISIT (OUTPATIENT)
Dept: INTERNAL MEDICINE CLINIC | Age: 49
End: 2024-02-22

## 2024-02-22 VITALS
HEART RATE: 68 BPM | OXYGEN SATURATION: 98 % | WEIGHT: 189 LBS | BODY MASS INDEX: 28.74 KG/M2 | SYSTOLIC BLOOD PRESSURE: 110 MMHG | DIASTOLIC BLOOD PRESSURE: 70 MMHG

## 2024-02-22 DIAGNOSIS — I77.819 ECTATIC AORTA (HCC): ICD-10-CM

## 2024-02-22 DIAGNOSIS — R42 EPISODIC LIGHTHEADEDNESS: ICD-10-CM

## 2024-02-22 DIAGNOSIS — R09.81 SINUS CONGESTION: ICD-10-CM

## 2024-02-22 DIAGNOSIS — F41.9 ANXIETY AND DEPRESSION: Primary | ICD-10-CM

## 2024-02-22 DIAGNOSIS — F32.A ANXIETY AND DEPRESSION: Primary | ICD-10-CM

## 2024-02-22 PROCEDURE — 93000 ELECTROCARDIOGRAM COMPLETE: CPT | Performed by: PHYSICIAN ASSISTANT

## 2024-02-22 PROCEDURE — 99214 OFFICE O/P EST MOD 30 MIN: CPT | Performed by: PHYSICIAN ASSISTANT

## 2024-02-22 RX ORDER — ESCITALOPRAM OXALATE 10 MG/1
10 TABLET ORAL DAILY
Qty: 30 TABLET | Refills: 1 | Status: SHIPPED | OUTPATIENT
Start: 2024-02-22

## 2024-02-22 RX ORDER — HYDROXYZINE PAMOATE 25 MG/1
25 CAPSULE ORAL 2 TIMES DAILY PRN
Qty: 30 CAPSULE | Refills: 0 | Status: CANCELLED | OUTPATIENT
Start: 2024-02-22

## 2024-02-22 RX ORDER — FLUTICASONE PROPIONATE 50 MCG
2 SPRAY, SUSPENSION (ML) NASAL DAILY
Qty: 16 G | Refills: 0 | Status: SHIPPED | OUTPATIENT
Start: 2024-02-22

## 2024-02-22 RX ORDER — HYDROXYZINE HYDROCHLORIDE 10 MG/1
10 TABLET, FILM COATED ORAL 3 TIMES DAILY PRN
Qty: 30 TABLET | Refills: 1 | Status: SHIPPED | OUTPATIENT
Start: 2024-02-22 | End: 2024-03-13

## 2024-02-22 ASSESSMENT — ENCOUNTER SYMPTOMS
BLOOD IN STOOL: 0
CONSTIPATION: 0
SORE THROAT: 0
EYE REDNESS: 0
PHOTOPHOBIA: 0
COUGH: 0
WHEEZING: 0
SHORTNESS OF BREATH: 0
EYE DISCHARGE: 0
COLOR CHANGE: 0
NAUSEA: 0
BACK PAIN: 0
ABDOMINAL PAIN: 0
DIARRHEA: 0
RHINORRHEA: 0
EYE PAIN: 0
CHEST TIGHTNESS: 0
VOMITING: 0

## 2024-02-22 ASSESSMENT — PATIENT HEALTH QUESTIONNAIRE - PHQ9
SUM OF ALL RESPONSES TO PHQ QUESTIONS 1-9: 24
SUM OF ALL RESPONSES TO PHQ9 QUESTIONS 1 & 2: 6
6. FEELING BAD ABOUT YOURSELF - OR THAT YOU ARE A FAILURE OR HAVE LET YOURSELF OR YOUR FAMILY DOWN: 3
3. TROUBLE FALLING OR STAYING ASLEEP: 3
8. MOVING OR SPEAKING SO SLOWLY THAT OTHER PEOPLE COULD HAVE NOTICED. OR THE OPPOSITE, BEING SO FIGETY OR RESTLESS THAT YOU HAVE BEEN MOVING AROUND A LOT MORE THAN USUAL: 3
SUM OF ALL RESPONSES TO PHQ QUESTIONS 1-9: 24
2. FEELING DOWN, DEPRESSED OR HOPELESS: 3
9. THOUGHTS THAT YOU WOULD BE BETTER OFF DEAD, OR OF HURTING YOURSELF: 0
4. FEELING TIRED OR HAVING LITTLE ENERGY: 3
SUM OF ALL RESPONSES TO PHQ QUESTIONS 1-9: 24
1. LITTLE INTEREST OR PLEASURE IN DOING THINGS: 3
SUM OF ALL RESPONSES TO PHQ QUESTIONS 1-9: 24
5. POOR APPETITE OR OVEREATING: 3
7. TROUBLE CONCENTRATING ON THINGS, SUCH AS READING THE NEWSPAPER OR WATCHING TELEVISION: 3
10. IF YOU CHECKED OFF ANY PROBLEMS, HOW DIFFICULT HAVE THESE PROBLEMS MADE IT FOR YOU TO DO YOUR WORK, TAKE CARE OF THINGS AT HOME, OR GET ALONG WITH OTHER PEOPLE: 2

## 2024-02-22 ASSESSMENT — ANXIETY QUESTIONNAIRES
3. WORRYING TOO MUCH ABOUT DIFFERENT THINGS: 3
IF YOU CHECKED OFF ANY PROBLEMS ON THIS QUESTIONNAIRE, HOW DIFFICULT HAVE THESE PROBLEMS MADE IT FOR YOU TO DO YOUR WORK, TAKE CARE OF THINGS AT HOME, OR GET ALONG WITH OTHER PEOPLE: VERY DIFFICULT
7. FEELING AFRAID AS IF SOMETHING AWFUL MIGHT HAPPEN: 1
5. BEING SO RESTLESS THAT IT IS HARD TO SIT STILL: 0
2. NOT BEING ABLE TO STOP OR CONTROL WORRYING: 3
1. FEELING NERVOUS, ANXIOUS, OR ON EDGE: 3
GAD7 TOTAL SCORE: 15
6. BECOMING EASILY ANNOYED OR IRRITABLE: 3
4. TROUBLE RELAXING: 2

## 2024-02-22 ASSESSMENT — COLUMBIA-SUICIDE SEVERITY RATING SCALE - C-SSRS
6. HAVE YOU EVER DONE ANYTHING, STARTED TO DO ANYTHING, OR PREPARED TO DO ANYTHING TO END YOUR LIFE?: NO
2. HAVE YOU ACTUALLY HAD ANY THOUGHTS OF KILLING YOURSELF?: NO
1. WITHIN THE PAST MONTH, HAVE YOU WISHED YOU WERE DEAD OR WISHED YOU COULD GO TO SLEEP AND NOT WAKE UP?: YES

## 2024-02-22 NOTE — PROGRESS NOTES
R-1Normal  2. Episodic lightheadedness   -Follow-up with cardiology, did have abnormal stress test previously.  -     EKG 12 Lead  -     Sotero Gray MD, Cardiology, [place]  3. Sinus congestion   -Recommend vaping cessation, can try sinus irrigation and Flonase daily, may be contributory to lightheadedness sensation.  -     fluticasone (FLONASE) 50 MCG/ACT nasal spray; 2 sprays by Each Nostril route daily, Disp-16 g, R-0Normal  4. Ectatic aorta (HCC)   -Discussed appropriate follow up etc.  -     Sotero Gray MD, Cardiology, [place]      No follow-ups on file.            An electronic signature was used to authenticate this note.    --JANIE Cardoza

## 2024-02-23 NOTE — PROGRESS NOTES
Patient reports she was sent home again from work today.  Work note given for today.  Keep appointment with PCP as scheduled on Monday.  Report to emergency department over the weekend if new symptoms arise etc.

## 2024-02-26 ENCOUNTER — OFFICE VISIT (OUTPATIENT)
Dept: INTERNAL MEDICINE CLINIC | Age: 49
End: 2024-02-26
Payer: COMMERCIAL

## 2024-02-26 VITALS
BODY MASS INDEX: 29.01 KG/M2 | TEMPERATURE: 97.7 F | HEART RATE: 64 BPM | OXYGEN SATURATION: 97 % | WEIGHT: 190.8 LBS | RESPIRATION RATE: 16 BRPM | SYSTOLIC BLOOD PRESSURE: 120 MMHG | DIASTOLIC BLOOD PRESSURE: 72 MMHG

## 2024-02-26 DIAGNOSIS — R07.2 PRECORDIAL PAIN: ICD-10-CM

## 2024-02-26 DIAGNOSIS — F33.1 MODERATE EPISODE OF RECURRENT MAJOR DEPRESSIVE DISORDER (HCC): ICD-10-CM

## 2024-02-26 DIAGNOSIS — F41.9 ANXIETY: Primary | ICD-10-CM

## 2024-02-26 DIAGNOSIS — R91.1 LUNG NODULE: ICD-10-CM

## 2024-02-26 DIAGNOSIS — E78.5 DYSLIPIDEMIA: ICD-10-CM

## 2024-02-26 PROCEDURE — 1036F TOBACCO NON-USER: CPT | Performed by: INTERNAL MEDICINE

## 2024-02-26 PROCEDURE — 3017F COLORECTAL CA SCREEN DOC REV: CPT | Performed by: INTERNAL MEDICINE

## 2024-02-26 PROCEDURE — 99214 OFFICE O/P EST MOD 30 MIN: CPT | Performed by: INTERNAL MEDICINE

## 2024-02-26 PROCEDURE — G8484 FLU IMMUNIZE NO ADMIN: HCPCS | Performed by: INTERNAL MEDICINE

## 2024-02-26 PROCEDURE — G8417 CALC BMI ABV UP PARAM F/U: HCPCS | Performed by: INTERNAL MEDICINE

## 2024-02-26 PROCEDURE — G8427 DOCREV CUR MEDS BY ELIG CLIN: HCPCS | Performed by: INTERNAL MEDICINE

## 2024-02-26 ASSESSMENT — PATIENT HEALTH QUESTIONNAIRE - PHQ9
10. IF YOU CHECKED OFF ANY PROBLEMS, HOW DIFFICULT HAVE THESE PROBLEMS MADE IT FOR YOU TO DO YOUR WORK, TAKE CARE OF THINGS AT HOME, OR GET ALONG WITH OTHER PEOPLE: 2
SUM OF ALL RESPONSES TO PHQ QUESTIONS 1-9: 23
2. FEELING DOWN, DEPRESSED OR HOPELESS: 3
1. LITTLE INTEREST OR PLEASURE IN DOING THINGS: 3
9. THOUGHTS THAT YOU WOULD BE BETTER OFF DEAD, OR OF HURTING YOURSELF: 0
5. POOR APPETITE OR OVEREATING: 3
SUM OF ALL RESPONSES TO PHQ QUESTIONS 1-9: 23
SUM OF ALL RESPONSES TO PHQ QUESTIONS 1-9: 23
8. MOVING OR SPEAKING SO SLOWLY THAT OTHER PEOPLE COULD HAVE NOTICED. OR THE OPPOSITE, BEING SO FIGETY OR RESTLESS THAT YOU HAVE BEEN MOVING AROUND A LOT MORE THAN USUAL: 3
6. FEELING BAD ABOUT YOURSELF - OR THAT YOU ARE A FAILURE OR HAVE LET YOURSELF OR YOUR FAMILY DOWN: 3
SUM OF ALL RESPONSES TO PHQ QUESTIONS 1-9: 23
SUM OF ALL RESPONSES TO PHQ9 QUESTIONS 1 & 2: 6
3. TROUBLE FALLING OR STAYING ASLEEP: 2
7. TROUBLE CONCENTRATING ON THINGS, SUCH AS READING THE NEWSPAPER OR WATCHING TELEVISION: 3
4. FEELING TIRED OR HAVING LITTLE ENERGY: 3

## 2024-02-26 ASSESSMENT — COLUMBIA-SUICIDE SEVERITY RATING SCALE - C-SSRS
2. HAVE YOU ACTUALLY HAD ANY THOUGHTS OF KILLING YOURSELF?: NO
1. WITHIN THE PAST MONTH, HAVE YOU WISHED YOU WERE DEAD OR WISHED YOU COULD GO TO SLEEP AND NOT WAKE UP?: YES
6. HAVE YOU EVER DONE ANYTHING, STARTED TO DO ANYTHING, OR PREPARED TO DO ANYTHING TO END YOUR LIFE?: NO
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
## 2024-03-05 ENCOUNTER — HOSPITAL ENCOUNTER (OUTPATIENT)
Dept: CT IMAGING | Age: 49
Discharge: HOME | End: 2024-03-05
Payer: COMMERCIAL

## 2024-03-05 DIAGNOSIS — R91.1 LUNG NODULE: ICD-10-CM

## 2024-03-05 PROCEDURE — 71250 CT THORAX DX C-: CPT

## 2024-03-07 ENCOUNTER — OFFICE VISIT (OUTPATIENT)
Dept: CARDIOLOGY CLINIC | Age: 49
End: 2024-03-07
Payer: COMMERCIAL

## 2024-03-07 VITALS
HEART RATE: 48 BPM | DIASTOLIC BLOOD PRESSURE: 86 MMHG | BODY MASS INDEX: 28.49 KG/M2 | WEIGHT: 188 LBS | SYSTOLIC BLOOD PRESSURE: 120 MMHG | HEIGHT: 68 IN

## 2024-03-07 DIAGNOSIS — F41.9 ANXIETY: ICD-10-CM

## 2024-03-07 DIAGNOSIS — E78.5 DYSLIPIDEMIA: ICD-10-CM

## 2024-03-07 DIAGNOSIS — Z72.0 TOBACCO ABUSE: ICD-10-CM

## 2024-03-07 DIAGNOSIS — R07.2 PRECORDIAL PAIN: ICD-10-CM

## 2024-03-07 DIAGNOSIS — R00.2 PALPITATIONS: Primary | ICD-10-CM

## 2024-03-07 DIAGNOSIS — I77.819 ECTATIC AORTA (HCC): ICD-10-CM

## 2024-03-07 DIAGNOSIS — R07.89 CHEST PRESSURE: ICD-10-CM

## 2024-03-07 PROCEDURE — G8417 CALC BMI ABV UP PARAM F/U: HCPCS | Performed by: INTERNAL MEDICINE

## 2024-03-07 PROCEDURE — 4004F PT TOBACCO SCREEN RCVD TLK: CPT | Performed by: INTERNAL MEDICINE

## 2024-03-07 PROCEDURE — 93000 ELECTROCARDIOGRAM COMPLETE: CPT | Performed by: INTERNAL MEDICINE

## 2024-03-07 PROCEDURE — G8427 DOCREV CUR MEDS BY ELIG CLIN: HCPCS | Performed by: INTERNAL MEDICINE

## 2024-03-07 PROCEDURE — 99214 OFFICE O/P EST MOD 30 MIN: CPT | Performed by: INTERNAL MEDICINE

## 2024-03-07 PROCEDURE — G8484 FLU IMMUNIZE NO ADMIN: HCPCS | Performed by: INTERNAL MEDICINE

## 2024-03-07 PROCEDURE — 3017F COLORECTAL CA SCREEN DOC REV: CPT | Performed by: INTERNAL MEDICINE

## 2024-03-07 NOTE — PROGRESS NOTES
CARDIOLOGY  NOTE    Chief Complaint: Abnormal CT Chest     HPI:   William is a 49 y.o. year old who has Past medical history as noted below.  William is having episodes of chest tightness and heaviness associated with anxiety pressure heaviness shortness of breath associated with exertion she thinks anxiety and stress makes it worse.  She is currently unemployed   episode of chest pressure pain tingling numbness shortness of breath she was thought to have panic attack or anxiety.  During work-up she had a CT chest which revealed ectatic thoracic aorta at 3.9 cm.  She was started on anxiety medication and is feeling better now.  She does report family history of heart problems denies any palpitations  EKG shows sinus bradycardia with heart rate in 40s  She has lost weight after cutting down on soda and caffeine intake  He she used to works as a   He also has a lung nodule , history of smoking but currently using significant amount of vaping      Current Outpatient Medications   Medication Sig Dispense Refill    escitalopram (LEXAPRO) 10 MG tablet Take 1 tablet by mouth daily 30 tablet 1    fluticasone (FLONASE) 50 MCG/ACT nasal spray 2 sprays by Each Nostril route daily 16 g 0    hydrOXYzine HCl (ATARAX) 10 MG tablet Take 1 tablet by mouth 3 times daily as needed for Anxiety 30 tablet 1     No current facility-administered medications for this visit.       Allergies:   Patient has no known allergies.    Patient History:  Past Medical History:   Diagnosis Date    Acute renal failure (HCC) 04/23/2015    Anxiety 01/24/2021    Calculi, ureter 04/23/2015    Carpal tunnel syndrome of left wrist 01/30/2018    Chronic back pain     Depression 12/05/2015    H/O echocardiogram 03/08/2023    EF 55-60%. Dilatation of the aortic root measuring 4.2 cm. Dilatation of the ascending aorta measuring 4.2 cm. Doppler evaluation reveals mild aortic, mitral, and tricuspid regurgitation

## 2024-03-08 ENCOUNTER — TELEPHONE (OUTPATIENT)
Age: 49
End: 2024-03-08

## 2024-03-08 ENCOUNTER — HOSPITAL ENCOUNTER (OUTPATIENT)
Age: 49
Discharge: HOME | End: 2024-03-08
Payer: COMMERCIAL

## 2024-03-08 DIAGNOSIS — R00.2 PALPITATIONS: ICD-10-CM

## 2024-03-08 DIAGNOSIS — I77.819 ECTATIC AORTA (HCC): ICD-10-CM

## 2024-03-08 DIAGNOSIS — E78.5 DYSLIPIDEMIA: ICD-10-CM

## 2024-03-08 DIAGNOSIS — R07.89 CHEST PRESSURE: ICD-10-CM

## 2024-03-08 DIAGNOSIS — F41.9 ANXIETY: ICD-10-CM

## 2024-03-08 DIAGNOSIS — Z72.0 TOBACCO ABUSE: ICD-10-CM

## 2024-03-08 DIAGNOSIS — R07.2 PRECORDIAL PAIN: ICD-10-CM

## 2024-03-08 LAB
CALCIUM SERPL-MCNC: 8.9 MG/DL (ref 8.3–10.6)
CHLORIDE BLD-SCNC: 103 MMOL/L (ref 99–110)
CHOLEST SERPL-MCNC: 198 MG/DL
CO2: 27 MMOL/L (ref 21–32)
CREAT SERPL-MCNC: 0.9 MG/DL (ref 0.6–1.1)
GFR SERPL CREATININE-BSD FRML MDRD: >60 ML/MIN/1.73M2
GLUCOSE SERPL-MCNC: 84 MG/DL (ref 70–99)
HDLC SERPL-MCNC: 58 MG/DL
LDLC SERPL CALC-MCNC: 127 MG/DL
POTASSIUM SERPL-SCNC: 4.3 MMOL/L (ref 3.5–5.1)
SODIUM BLD-SCNC: 141 MMOL/L (ref 135–145)
TSH SERPL DL<=0.005 MIU/L-ACNC: 1.15 UIU/ML (ref 0.27–4.2)

## 2024-03-08 PROCEDURE — 36415 COLL VENOUS BLD VENIPUNCTURE: CPT

## 2024-03-08 PROCEDURE — 80048 BASIC METABOLIC PNL TOTAL CA: CPT

## 2024-03-08 PROCEDURE — 84443 ASSAY THYROID STIM HORMONE: CPT

## 2024-03-08 PROCEDURE — 80061 LIPID PANEL: CPT

## 2024-03-08 NOTE — TELEPHONE ENCOUNTER
Pt calling asking for CT scan results. Results are back Please advise as soon as possible per patient request.

## 2024-03-14 ENCOUNTER — TELEPHONE (OUTPATIENT)
Age: 49
End: 2024-03-14

## 2024-03-14 NOTE — TELEPHONE ENCOUNTER
Patient would like to know if you could call something in for her sore throat.  Patient reports it started yesterday.  She reports her sinus issues are draining into her throat.  She is using Flonase but reports she feels that it is getting worse with that use.    Patient reports she uses [facility]

## 2024-03-14 NOTE — TELEPHONE ENCOUNTER
Patient would like to know if you could call something in for her sore throat.  Patient reports it started yesterday.  She reports her sinus issues are draining into her throat.  She is using Flonase but reports she feels that it is getting worse with that use.    Patient reports she uses [facility] [place]    Spoke with patient per Dr Chance and appt made for tomorrow at 0930 hours.

## 2024-03-15 ENCOUNTER — OFFICE VISIT (OUTPATIENT)
Age: 49
End: 2024-03-15

## 2024-03-15 VITALS
WEIGHT: 190 LBS | BODY MASS INDEX: 28.89 KG/M2 | OXYGEN SATURATION: 99 % | HEART RATE: 72 BPM | DIASTOLIC BLOOD PRESSURE: 68 MMHG | SYSTOLIC BLOOD PRESSURE: 118 MMHG | RESPIRATION RATE: 16 BRPM | TEMPERATURE: 97.2 F

## 2024-03-15 DIAGNOSIS — F32.A ANXIETY AND DEPRESSION: ICD-10-CM

## 2024-03-15 DIAGNOSIS — F41.9 ANXIETY: ICD-10-CM

## 2024-03-15 DIAGNOSIS — I77.819 ECTATIC AORTA (HCC): ICD-10-CM

## 2024-03-15 DIAGNOSIS — E78.5 DYSLIPIDEMIA: ICD-10-CM

## 2024-03-15 DIAGNOSIS — R91.1 LUNG NODULE: ICD-10-CM

## 2024-03-15 DIAGNOSIS — F33.1 MODERATE EPISODE OF RECURRENT MAJOR DEPRESSIVE DISORDER (HCC): ICD-10-CM

## 2024-03-15 DIAGNOSIS — J01.00 ACUTE NON-RECURRENT MAXILLARY SINUSITIS: Primary | ICD-10-CM

## 2024-03-15 DIAGNOSIS — F41.9 ANXIETY AND DEPRESSION: ICD-10-CM

## 2024-03-15 RX ORDER — HYDROXYZINE HYDROCHLORIDE 10 MG/1
10 TABLET, FILM COATED ORAL NIGHTLY PRN
Qty: 30 TABLET | Refills: 3 | Status: SHIPPED | OUTPATIENT
Start: 2024-03-15

## 2024-03-15 RX ORDER — AZITHROMYCIN 250 MG/1
TABLET, FILM COATED ORAL
Qty: 1 PACKET | Refills: 0 | Status: SHIPPED | OUTPATIENT
Start: 2024-03-15

## 2024-03-15 RX ORDER — ESCITALOPRAM OXALATE 10 MG/1
10 TABLET ORAL DAILY
Qty: 30 TABLET | Refills: 3 | Status: SHIPPED | OUTPATIENT
Start: 2024-03-15

## 2024-03-15 NOTE — PROGRESS NOTES
S1 and S2    PULMONARY: - No respiratory distress.  No wheezes or rales.    ABDOMEN: - Soft and non-tender,no masses  ororganomegaly.  EXTREMITIES: - No cyanosis, clubbing, or significant edema.  SKIN: Skin is warm and dry.   NEUROLOGICAL: - Cranial nerves II through XII are grossly intact.      IMPRESSION:    Encounter Diagnoses   Name Primary?    Acute non-recurrent maxillary sinusitis Yes    Anxiety and depression     Ectatic aorta (HCC)     Lung nodule     Dyslipidemia     Moderate episode of recurrent major depressive disorder (HCC)     Anxiety        ASSESSMENT/PLAN:    1. Acute non-recurrent maxillary sinusitis   Patient has upper respiratory infection and sinusitis also along with some sore throat.  Will give patient Zithromax and use Flonase.  May use Lei's for the sore throat.  If symptoms get worse call.  Increase fluid intake    2. Anxiety and depression   Anxiety and depression has improved a lot.  Patient states she is feeling much better.  Declined counseling  -     escitalopram (LEXAPRO) 10 MG tablet; Take 1 tablet by mouth daily, Disp-30 tablet, R-3Normal  -     hydrOXYzine HCl (ATARAX) 10 MG tablet; Take 1 tablet by mouth nightly as needed for Anxiety, Disp-30 tablet, R-3Normal  3. Ectatic aorta (HCC)  Overview:  CTA 12/22/2022: Ascending aorta at 3.9 cm  CT chest 3/5/2024: Ascending aorta 4.4 cm  Echo: 3/13/2024 : Mildly dilated aortic root. Ao root diameter is 3.8 cm. Mildly dilated ascending aorta. Ao ascending diameter is 4.2 cm.   Patient sees Dr. Dave and has follow-up appointment     4. Lung nodule  Overview:  CT scan of the chest ordered  3/5/2024: Patient has multiple small nodules.  Recheck in 1 year     5. Dyslipidemia  Overview:  Follow low-cholesterol diet.  Lipid profile slightly elevated.    6. Moderate episode of recurrent major depressive disorder (HCC)  Overview:  Patient is on Lexapro 10 mg daily and also hydroxyzine 10 mg daily as needed.  Advised patient to see psychiatrist

## 2024-03-18 ENCOUNTER — TELEPHONE (OUTPATIENT)
Dept: CARDIOLOGY CLINIC | Age: 49
End: 2024-03-18

## 2024-03-18 NOTE — TELEPHONE ENCOUNTER
Called patient left a message with the results of recent testing. Echo - EF of 40 - 45%. Aortic Valve: Mild regurgitation. Mitral Valve: Mild regurgitation. Tricuspid Valve: Mild regurgitation. Normal RVSP. Aorta: Mildly dilated aortic root. Ao root diameter is 3.8 cm. Mildly dilated ascending aorta. Ao ascending diameter is 4.2 cm.  Nm -  LV perfusion is normal. There is no evidence of inducible ischemia. Ejection fraction is 54%

## 2024-04-11 ENCOUNTER — OFFICE VISIT (OUTPATIENT)
Dept: CARDIOLOGY CLINIC | Age: 49
End: 2024-04-11
Payer: COMMERCIAL

## 2024-04-11 VITALS
DIASTOLIC BLOOD PRESSURE: 66 MMHG | WEIGHT: 195 LBS | HEIGHT: 68 IN | HEART RATE: 60 BPM | SYSTOLIC BLOOD PRESSURE: 108 MMHG | BODY MASS INDEX: 29.55 KG/M2

## 2024-04-11 DIAGNOSIS — E78.5 DYSLIPIDEMIA: ICD-10-CM

## 2024-04-11 DIAGNOSIS — I77.819 ECTATIC AORTA (HCC): Primary | ICD-10-CM

## 2024-04-11 DIAGNOSIS — R07.2 PRECORDIAL PAIN: ICD-10-CM

## 2024-04-11 DIAGNOSIS — R91.1 LUNG NODULE: ICD-10-CM

## 2024-04-11 DIAGNOSIS — F33.1 MODERATE EPISODE OF RECURRENT MAJOR DEPRESSIVE DISORDER (HCC): ICD-10-CM

## 2024-04-11 DIAGNOSIS — Z72.0 TOBACCO ABUSE: ICD-10-CM

## 2024-04-11 PROCEDURE — 4004F PT TOBACCO SCREEN RCVD TLK: CPT | Performed by: INTERNAL MEDICINE

## 2024-04-11 PROCEDURE — 3017F COLORECTAL CA SCREEN DOC REV: CPT | Performed by: INTERNAL MEDICINE

## 2024-04-11 PROCEDURE — 99214 OFFICE O/P EST MOD 30 MIN: CPT | Performed by: INTERNAL MEDICINE

## 2024-04-11 PROCEDURE — G8417 CALC BMI ABV UP PARAM F/U: HCPCS | Performed by: INTERNAL MEDICINE

## 2024-04-11 PROCEDURE — G8427 DOCREV CUR MEDS BY ELIG CLIN: HCPCS | Performed by: INTERNAL MEDICINE

## 2024-04-11 NOTE — PROGRESS NOTES
stools, constipation, diarrhea or heartburn  Genitourinary: No dysuria, trouble voiding, or hematuria  Musculoskeletal:  denies any new  joint aches , swelling  or pain   Integumentary: No rash or pruritis  Neurological: No TIA or stroke symptoms  Psychiatric: No anxiety or depression  Endocrine: No malaise, fatigue or temperature intolerance  Hematologic/Lymphatic: No bleeding problems, blood clots or swollen lymph nodes  Allergic/Immunologic: No nasal congestion or hives    Objective:      Physical Exam:  /66   Pulse 60   Ht 1.727 m (5' 8\")   Wt 88.5 kg (195 lb)   BMI 29.65 kg/m²   Wt Readings from Last 3 Encounters:   04/11/24 88.5 kg (195 lb)   03/15/24 86.2 kg (190 lb)   03/13/24 85.3 kg (188 lb)     Body mass index is 29.65 kg/m².  Vitals:    04/11/24 1125   BP: 108/66   Pulse: 60        General Appearance:  No distress, conversant  Constitutional:  Well developed, Well nourished, No acute distress, Non-toxic appearance.   HENT:  Normocephalic, Atraumatic, Bilateral external ears normal, Oropharynx moist, No oral exudates, Nose normal. Neck- Normal range of motion, No tenderness, Supple, No stridor,no apical-carotid delay  Eyes:  PERRL, EOMI, Conjunctiva normal, No discharge.   Respiratory:  Normal breath sounds, No respiratory distress, No wheezing, No chest tenderness.,no use of accessory muscles, NO crackles  Cardiovascular: (PMI) apex non displaced,no lifts no thrills,S1 and S2 audible, No added heart sounds, No signs of ankle edema, or volume overload, No evidence of JVD, No crackles   GI:  Bowel sounds normal, Soft, No tenderness, No masses, No gross visceromegaly   :  No costovertebral angle tenderness   Musculoskeletal:  No edema, no tenderness, no deformities. Back- no tenderness  Integument:  Well hydrated, no rash   Lymphatic:  No lymphadenopathy noted   Neurologic:  Alert & oriented x 3, CN 2-12 normal, normal motor function, normal sensory function, no focal deficits noted

## 2024-05-15 ENCOUNTER — OFFICE VISIT (OUTPATIENT)
Age: 49
End: 2024-05-15
Payer: COMMERCIAL

## 2024-05-15 VITALS
HEART RATE: 67 BPM | OXYGEN SATURATION: 97 % | HEIGHT: 68 IN | DIASTOLIC BLOOD PRESSURE: 60 MMHG | SYSTOLIC BLOOD PRESSURE: 92 MMHG | BODY MASS INDEX: 29.31 KG/M2 | WEIGHT: 193.4 LBS

## 2024-05-15 DIAGNOSIS — Z72.0 TOBACCO ABUSE: ICD-10-CM

## 2024-05-15 DIAGNOSIS — F41.9 ANXIETY AND DEPRESSION: ICD-10-CM

## 2024-05-15 DIAGNOSIS — E78.5 DYSLIPIDEMIA: ICD-10-CM

## 2024-05-15 DIAGNOSIS — F33.1 MODERATE EPISODE OF RECURRENT MAJOR DEPRESSIVE DISORDER (HCC): Primary | ICD-10-CM

## 2024-05-15 DIAGNOSIS — R91.1 LUNG NODULE: ICD-10-CM

## 2024-05-15 DIAGNOSIS — F32.A ANXIETY AND DEPRESSION: ICD-10-CM

## 2024-05-15 PROCEDURE — 4004F PT TOBACCO SCREEN RCVD TLK: CPT | Performed by: INTERNAL MEDICINE

## 2024-05-15 PROCEDURE — 3017F COLORECTAL CA SCREEN DOC REV: CPT | Performed by: INTERNAL MEDICINE

## 2024-05-15 PROCEDURE — 99213 OFFICE O/P EST LOW 20 MIN: CPT | Performed by: INTERNAL MEDICINE

## 2024-05-15 PROCEDURE — G8417 CALC BMI ABV UP PARAM F/U: HCPCS | Performed by: INTERNAL MEDICINE

## 2024-05-15 PROCEDURE — G8427 DOCREV CUR MEDS BY ELIG CLIN: HCPCS | Performed by: INTERNAL MEDICINE

## 2024-05-15 RX ORDER — ESCITALOPRAM OXALATE 20 MG/1
20 TABLET ORAL DAILY
Qty: 30 TABLET | Refills: 2 | Status: SHIPPED | OUTPATIENT
Start: 2024-05-15

## 2024-05-15 NOTE — PROGRESS NOTES
patient to go to [facility] for counseling patient declined  Lung nodules recheck in March 2025  Follow low-cholesterol diet  Patient is thinking of moving to a different state where her family is.      Return to office in 3 months  Medications were reviewed with the patient. Continue current medications.  Appropriate prescriptions were addressed. After visit melanie provided.  Follow up as directed : sooner if needed.  Questions were answered and patient verbalized understanding. Call for any problems, questions, or concerns.       No Known Allergies  Current Outpatient Medications   Medication Sig Dispense Refill    escitalopram (LEXAPRO) 10 MG tablet Take 1 tablet by mouth daily 30 tablet 3    fluticasone (FLONASE) 50 MCG/ACT nasal spray 2 sprays by Each Nostril route daily 16 g 0    hydrOXYzine HCl (ATARAX) 10 MG tablet Take 1 tablet by mouth nightly as needed for Anxiety (Patient not taking: Reported on 4/11/2024) 30 tablet 3    azithromycin (ZITHROMAX) 250 MG tablet Take 2 tabs (500 mg) on Day 1, and take 1 tab (250 mg) on days 2 through 5. (Patient not taking: Reported on 4/11/2024) 1 packet 0     No current facility-administered medications for this visit.     Past Medical History:   Diagnosis Date    Acute renal failure (HCC) 04/23/2015    Anxiety 01/24/2021    Calculi, ureter 04/23/2015    Carpal tunnel syndrome of left wrist 01/30/2018    Chronic back pain     Depression 12/05/2015    H/O echocardiogram 03/08/2023    EF 55-60%. Dilatation of the aortic root measuring 4.2 cm. Dilatation of the ascending aorta measuring 4.2 cm. Doppler evaluation reveals mild aortic, mitral, and tricuspid regurgitation    H/O echocardiogram 03/13/2024    EF of 40 - 45%. Aortic Valve: Mild regurgitation. Mitral Valve: Mild regurgitation. Tricuspid Valve: Mild regurgitation. Normal RVSP. Aorta: Mildly dilated aortic root. Ao root diameter is 3.8 cm. Mildly dilated ascending aorta. Ao ascending diameter is 4.2 cm.    Headache

## 2024-08-19 ENCOUNTER — TELEPHONE (OUTPATIENT)
Age: 49
End: 2024-08-19

## 2024-08-29 ENCOUNTER — OFFICE VISIT (OUTPATIENT)
Dept: CARDIOLOGY CLINIC | Age: 49
End: 2024-08-29
Payer: COMMERCIAL

## 2024-08-29 VITALS
HEIGHT: 68 IN | SYSTOLIC BLOOD PRESSURE: 100 MMHG | HEART RATE: 50 BPM | DIASTOLIC BLOOD PRESSURE: 70 MMHG | WEIGHT: 199 LBS | BODY MASS INDEX: 30.16 KG/M2

## 2024-08-29 DIAGNOSIS — F41.9 ANXIETY: ICD-10-CM

## 2024-08-29 DIAGNOSIS — E78.5 DYSLIPIDEMIA: ICD-10-CM

## 2024-08-29 DIAGNOSIS — Z72.0 TOBACCO ABUSE: ICD-10-CM

## 2024-08-29 DIAGNOSIS — R07.2 PRECORDIAL PAIN: Primary | ICD-10-CM

## 2024-08-29 DIAGNOSIS — F33.1 MODERATE EPISODE OF RECURRENT MAJOR DEPRESSIVE DISORDER (HCC): ICD-10-CM

## 2024-08-29 DIAGNOSIS — I77.819 ECTATIC AORTA (HCC): ICD-10-CM

## 2024-08-29 PROCEDURE — G8417 CALC BMI ABV UP PARAM F/U: HCPCS | Performed by: INTERNAL MEDICINE

## 2024-08-29 PROCEDURE — G8427 DOCREV CUR MEDS BY ELIG CLIN: HCPCS | Performed by: INTERNAL MEDICINE

## 2024-08-29 PROCEDURE — 99214 OFFICE O/P EST MOD 30 MIN: CPT | Performed by: INTERNAL MEDICINE

## 2024-08-29 PROCEDURE — 4004F PT TOBACCO SCREEN RCVD TLK: CPT | Performed by: INTERNAL MEDICINE

## 2024-08-29 PROCEDURE — 3017F COLORECTAL CA SCREEN DOC REV: CPT | Performed by: INTERNAL MEDICINE

## 2024-08-29 PROCEDURE — 93000 ELECTROCARDIOGRAM COMPLETE: CPT | Performed by: INTERNAL MEDICINE

## 2024-08-29 NOTE — PROGRESS NOTES
CARDIOLOGY  NOTE    Chief Complaint: Abnormal CT Chest     HPI:   William is a 49 y.o. year old who has Past medical history as noted below.  William is having episodes of chest tightness and heaviness associated with anxiety pressure heaviness shortness of breath associated with exertion she thinks anxiety and stress makes it worse.  She went to ED with chest pain , heart racing and tingling in hands , blurry vision , she felt weak and lightheaded , workup in ED was negative she had 2 monster drinks before the event she has started taking her anxiety meds again   Stress and echo were normal in march 2024    episode of chest pressure pain tingling numbness shortness of breath she was thought to have panic attack or anxiety.  During work-up she had a CT chest which revealed ectatic thoracic aorta at 3.9 cm.  She was started on anxiety medication and is feeling better now.  She does report family history of heart problems denies any palpitations  EKG shows sinus bradycardia with heart rate in 40s  She has lost weight after cutting down on soda and caffeine intake  He she used to works as a   He also has a lung nodule , history of smoking but currently using significant amount of vaping      Current Outpatient Medications   Medication Sig Dispense Refill    escitalopram (LEXAPRO) 20 MG tablet Take 1 tablet by mouth daily 30 tablet 2    fluticasone (FLONASE) 50 MCG/ACT nasal spray 2 sprays by Each Nostril route daily 16 g 0     No current facility-administered medications for this visit.       Allergies:   Patient has no known allergies.    Patient History:  Past Medical History:   Diagnosis Date    Acute renal failure (HCC) 04/23/2015    Anxiety 01/24/2021    Calculi, ureter 04/23/2015    Carpal tunnel syndrome of left wrist 01/30/2018    Chronic back pain     Depression 12/05/2015    H/O echocardiogram 03/08/2023    EF 55-60%. Dilatation of the aortic root measuring  Questions answered and patient verbalizes understanding.  Call for any problems, questions, or concerns.Greater than 60 % of time spent counseling besides reviewing data and images     Continue all other medications of all above medical condition listed as is.    No follow-ups on file.    Please note this report has been partially produced using speech recognition software and may contain errors related to that system including errors in grammar, punctuation, and spelling, as well as words and phrases that may be inappropriate. If there are any questions or concerns please feel free to contact the dictating provider for clarification.

## 2024-09-03 ENCOUNTER — TELEPHONE (OUTPATIENT)
Age: 49
End: 2024-09-03

## 2024-09-03 NOTE — TELEPHONE ENCOUNTER
9-3-24 1235 hours. LM on voicemail that dr is out this afternoon can send 30 supply of medication refill but needs to r/x today's visit per Dr MOLINA-(alison will stay over if needed) patient will be out of medication/patient was also seen in the E-